# Patient Record
Sex: FEMALE | Race: WHITE | Employment: FULL TIME | URBAN - METROPOLITAN AREA
[De-identification: names, ages, dates, MRNs, and addresses within clinical notes are randomized per-mention and may not be internally consistent; named-entity substitution may affect disease eponyms.]

---

## 2021-01-11 ENCOUNTER — HOSPITAL ENCOUNTER (OUTPATIENT)
Dept: PHYSICAL THERAPY | Age: 26
Discharge: HOME | End: 2021-01-11
Payer: COMMERCIAL

## 2021-01-11 PROCEDURE — 97110 THERAPEUTIC EXERCISES: CPT

## 2021-01-11 PROCEDURE — 97162 PT EVAL MOD COMPLEX 30 MIN: CPT

## 2021-01-11 NOTE — PROGRESS NOTES
[facility]  [address]  [place], [address]  [phone] fax    Plan of Care/ Statement of Necessity for Physical Therapy Services    Patient name: Jimmy Shen Start of Care: 2021   Referral source: Rach Braswell : 1995    Medical Diagnosis: Foot pain, right [M79.671]  Payor: [employer] / Plan: [address] [place] [address] / Product Type: PPO /  Onset Date:20    Treatment Diagnosis: Right foot pain (s/p CCT arthrodesis)   Prior Hospitalization: see medical history Provider#: [id number]   Medications: Verified on Patient summary List    Comorbidities: Previous foot and ankle surgeries (, , ), Mitral/tricuspid regurgitation, atopic dermatitis, latex allergy, early osteoporosis   Prior Level of Function: functionally I with daily tasks. The Plan of Care and following information is based on the information from the initial evaluation. Assessment/ key information: 27 y/o female presents s/p right foot CCT arthrodesis as above. The pt presents ambulating with use of cam walking boot. She reports pain increases with weightbearing activities. She reports she has been in the camboot since 12/15/20 and was initially NWB and has progressed to WBAT. The pt demonstrates significantly limited right ankle ROM, limited right ankle strength, mild right ankle/foot edema, limited scar mobility and limited strength of the right LE. The pt will benefit from PT to address the aforementioned impairments. Evaluation Complexity History MEDIUM  Complexity : 1-2 comorbidities / personal factors will impact the outcome/ POC ; Examination MEDIUM Complexity : 3 Standardized tests and measures addressing body structure, function, activity limitation and / or participation in recreation  ;Presentation MEDIUM Complexity : Evolving with changing characteristics  ; Clinical Decision Making MEDIUM Complexity : FOTO score of 26-74  Overall Complexity Rating: MEDIUM  Problem List: pain affecting function, decrease ROM, decrease strength, edema affecting function, impaired gait/ balance, decrease ADL/ functional abilitiies, decrease activity tolerance and decrease flexibility/ joint mobility   Treatment Plan may include any combination of the following: Therapeutic exercise, Therapeutic activities, Neuromuscular re-education, Physical agent/modality, Gait/balance training, Manual therapy, Patient education and Self Care training  Patient / Family readiness to learn indicated by: asking questions, trying to perform skills and interest  Persons(s) to be included in education: patient (P)  Barriers to Learning/Limitations: None  Patient Goal (s): To get back to normal activities  Patient Self Reported Health Status: good  Rehabilitation Potential: good    Short Term Goals: To be accomplished in 1 weeks:   1. The pt will be I and compliant with HEP     Long Term Goals: To be accomplished in 4 weeks:   1. Improve FOTO score to 68 for improved ability for functional tasks. 2. Improve right ankle AROM DF to 5 degrees to improve ability for gait      3. Improve right ankle DF and PF MMT to 4+/5 to improve ability for daily tasks     4. The pt will progress with gait without use of cam boot for community ambulation (once cleared). Frequency / Duration: Patient to be seen 2 times per week for 4 weeks. Patient/ Caregiver education and instruction: Diagnosis, prognosis, self care, activity modification, brace/ splint application and exercises   [x]  Plan of care has been reviewed with HENOK Root, PT 1/11/2021 3:25 PM    ________________________________________________________________________    I certify that the above Therapy Services are being furnished while the patient is under my care. I agree with the treatment plan and certify that this therapy is necessary.     [facility] Signature:____________Date:_________TIME:________    [employer], Date and Time must be completed for valid certification **    Please sign and return to [address]  [place], [facility] [address]  [phone] fax

## 2021-01-15 ENCOUNTER — HOSPITAL ENCOUNTER (OUTPATIENT)
Dept: PHYSICAL THERAPY | Age: 26
Discharge: HOME | End: 2021-01-15
Payer: COMMERCIAL

## 2021-01-15 PROCEDURE — 97110 THERAPEUTIC EXERCISES: CPT

## 2021-01-15 PROCEDURE — 97140 MANUAL THERAPY 1/> REGIONS: CPT

## 2021-01-15 NOTE — PROGRESS NOTES
PT DAILY TREATMENT NOTE     Patient Name: Lina Nelson  Date:1/15/2021  : 1995  [x]  Patient  Verified  Payor: [employer] / Plan: [address] [place] [address] / Product Type: PPO /    In time:4:12  Out time: 5:10  Total Treatment Time (min): 58  Visit #: 2 of 8    Medicare/BCBS Only   Total Timed Codes (min):  58 1:1 Treatment Time:  58       Treatment Area: Foot pain, right [M79.671]    SUBJECTIVE  Pain Level (0-10 scale): 2  Any medication changes, allergies to medications, adverse drug reactions, diagnosis change, or new procedure performed?: [x] No    [] Yes (see summary sheet for update)  Subjective functional status/changes:   [] No changes reported  Minor soreness. OBJECTIVE      42 min Therapeutic Exercise:  [x] See flow sheet :   Rationale: increase ROM, increase strength and improve coordination to improve the patients ability to tolerate increased activity    18 min Manual Therapy:  Right ankle mortis mobs, Right ankle mobs for DF/PF, Lateral scar massage   The manual therapy interventions were performed at a separate and distinct time from the therapeutic activities interventions.   Rationale: decrease pain, increase ROM and increase tissue extensibility to improve daily function          With   [x] TE   [] TA   [] neuro   [] other: Patient Education: [x] Review HEP    [] Progressed/Changed HEP based on:   [] positioning   [] body mechanics   [] transfers   [] heat/ice application    [] other:      Other Objective/Functional Measures:   - AROM Right Ankle DF 5*  PF 28*  IV6*  EV4*  - PROM right ankle DF13*  PF36*  IV22* EV10*       Pain Level (0-10 scale) post treatment: 2    ASSESSMENT/Changes in Function:   - AROM after mobs/glides: DF10  PF37  - Good tolerance with first full treatment  - Good response to treatment with improved ankle mobility    Patient will continue to benefit from skilled PT services to modify and progress therapeutic interventions, address functional mobility deficits, address ROM deficits, address strength deficits, analyze and address soft tissue restrictions and analyze and cue movement patterns to attain remaining goals. []  See Plan of Care  []  See progress note/recertification  []  See Discharge Summary         Progress towards goals / Updated goals:  Short Term Goals: To be accomplished in 1 weeks:               1. The pt will be I and compliant with HEP  Current Status: Pt reports compliance with HEP                 Long Term Goals: To be accomplished in 4 weeks:               1. Improve FOTO score to 68 for improved ability for functional tasks. 2. Improve right ankle AROM DF to 5 degrees to improve ability for gait  Current Status: Met with DF 5* upon arrival, increasing to 10* after manual therapy                              3. Improve right ankle DF and PF MMT to 4+/5 to improve ability for daily tasks                  4. The pt will progress with gait without use of cam boot for community ambulation (once cleared).      PLAN  [x]  Upgrade activities as tolerated     [x]  Continue plan of care  []  Update interventions per flow sheet       []  Discharge due to:_  []  Other:_      Maxim Ibarra, PT 1/15/2021  4:16 PM    Future Appointments   Date Time Provider Dianelys Rivera   1/19/2021  4:15 PM Malissa Leslie, PT [facility]   1/22/2021  4:15 PM Malissa Leslie, PT [facility]

## 2021-01-19 ENCOUNTER — HOSPITAL ENCOUNTER (OUTPATIENT)
Dept: PHYSICAL THERAPY | Age: 26
Discharge: HOME | End: 2021-01-19
Payer: COMMERCIAL

## 2021-01-19 PROCEDURE — 97110 THERAPEUTIC EXERCISES: CPT

## 2021-01-19 PROCEDURE — 97140 MANUAL THERAPY 1/> REGIONS: CPT

## 2021-01-19 NOTE — PROGRESS NOTES
PT DAILY TREATMENT NOTE     Patient Name: Brandon Sparks  Date:2021  : 1995  [x]  Patient  Verified  Payor: [employer] / Plan: [address] [place] [address] / Product Type: PPO /    In time:4:14  Out time:5:19  Total Treatment Time (min): 65  Visit #: 3 of 8    Medicare/BCBS Only   Total Timed Codes (min):  65 1:1 Treatment Time:  65       Treatment Area: Foot pain, right [M79.671]    SUBJECTIVE  Pain Level (0-10 scale): 3  Any medication changes, allergies to medications, adverse drug reactions, diagnosis change, or new procedure performed?: [x] No    [] Yes (see summary sheet for update)  Subjective functional status/changes:   [] No changes reported  Pt reports feeling good. Notes her big toe feels \"sore\" on the underside at the joint. States she's been doing her HEP daily. OBJECTIVE    38 min Therapeutic Exercise:  [x]? See flow sheet :   Rationale: increase ROM, increase strength and improve coordination to improve the patients ability to tolerate increased activity     27 min Manual Therapy:  pt seated on plinth - ankle distraction with DF/PF, glides into DF/PF, ankle mortis mobs, cuneiform mobs   The manual therapy interventions were performed at a separate and distinct time from the therapeutic activities interventions. Rationale: decrease pain, increase ROM and increase tissue extensibility to improve daily function          With   [x] TE   [] TA   [] neuro   [] other: Patient Education: [x] Review HEP    [] Progressed/Changed HEP based on:   [x] positioning   [x] body mechanics   [] transfers   [] heat/ice application    [] other:      Other Objective/Functional Measures:   - palpation: tightness/TP in gastroc     Pain Level (0-10 scale) post treatment: 3 \"sore\"    ASSESSMENT/Changes in Function:   Pt performed well today. Tolerated new exercises well. Palpation found tenderness/tightness/TP in gastroc near achilles; light calf stretch to address.   MT performed to address limited movement in ankle foot; pt reported less pain in big toe. Pt finished exercises with reported increase in muscle \"soreness,\" without increase in pain. REC at [facility]: 1) MT/TPR to gastroc, 2) ROM measures DF/PF/EV/IV, 3) consider MMT, 4) update goals    Patient will continue to benefit from skilled PT services to modify and progress therapeutic interventions, address functional mobility deficits, address ROM deficits, address strength deficits, analyze and address soft tissue restrictions and analyze and cue movement patterns to attain remaining goals. []  See Plan of Care  []  See progress note/recertification  []  See Discharge Summary         Progress towards goals / Updated goals:  Short Term Goals: To be accomplished in 1 weeks:  1. The pt will be I and compliant with HEP   Current Status: Pt reports compliance with HEP                 Long Term Goals: To be accomplished in 4 weeks:  1. Improve FOTO score to 68 for improved ability for functional tasks.                 2. Improve right ankle AROM DF to 5 degrees to improve ability for gait   Current Status: Met with DF 5* upon arrival, increasing to 10* after manual therapy (1/15/03501)                 3. Improve right ankle DF and PF MMT to 4+/5 to improve ability for daily tasks     4. The pt will progress with gait without use of cam boot for community ambulation (once cleared).      PLAN  [x]  Upgrade activities as tolerated     [x]  Continue plan of care  []  Update interventions per flow sheet       []  Discharge due to:_  []  Other:_      Sarahi Evangelista 1/19/2021  4:20 PM    Future Appointments   Date Time Provider Dianelys Rivera   1/22/2021  4:15 PM Tc Grider, PT [facility]   1/26/2021  5:00 PM Marialuisa Griffin [place] [facility]   1[facility]28/2021  [facility]   2/1/2021  4:15 PM Marialuisa Griffin, [place] [facility]   2/3/2021  5:00 PM Marialuisa Griffin [facility]

## 2021-01-22 ENCOUNTER — HOSPITAL ENCOUNTER (OUTPATIENT)
Dept: PHYSICAL THERAPY | Age: 26
Discharge: HOME | End: 2021-01-22
Payer: COMMERCIAL

## 2021-01-22 PROCEDURE — 97140 MANUAL THERAPY 1/> REGIONS: CPT

## 2021-01-22 PROCEDURE — 97110 THERAPEUTIC EXERCISES: CPT

## 2021-01-22 NOTE — PROGRESS NOTES
PT DAILY TREATMENT NOTE     Patient Name: Marin Martin  Date:2021  : 1995  [x]  Patient  Verified  Payor: [employer] / Plan: [address] [place] [address] / Product Type: PPO /    In time:4:15  Out time:5:18  Total Treatment Time (min): 63  Visit #: 4 of 8    Medicare/BCBS Only   Total Timed Codes (min):  63 1:1 Treatment Time:  63       Treatment Area: Foot pain, right [M79.671]    SUBJECTIVE  Pain Level (0-10 scale): 0  Any medication changes, allergies to medications, adverse drug reactions, diagnosis change, or new procedure performed?: [x] No    [] Yes (see summary sheet for update)  Subjective functional status/changes:   [] No changes reported  Pt reports feeling good today. Notes less pain in bog toe compared to last visit. Almita OROZCO . OBJECTIVE    39 min Therapeutic Exercise:  [x]? ? See flow sheet : stretches and strengthening, wt bearing exercise   Rationale: increase ROM, increase strength and improve coordination to improve the patients ability to tolerate increased activity     24 min Manual Therapy:  pt seated on plinth - ankle distraction with DF/PF, glides into DF/PF, ankle mortis mobs, cuneiform mobs, DTM/STM to achilles and distal gastroc   The manual therapy interventions were performed at a separate and distinct time from the therapeutic activities interventions. Rationale: decrease pain, increase ROM and increase tissue extensibility to improve daily function                                                                 With   [x]? TE   []? TA   []? neuro   []? other: Patient Education: [x]? Review HEP    []? Progressed/Changed HEP based on:   [x]? positioning   [x]? body mechanics   []? transfers   []? heat/ice application    []? other:      Other Objective/Functional Measures:    - increased reps for AP, ankle AROM, marbles, DF slides, HS curls, calf str    Pain Level (0-10 scale) post treatment: 0 \"sore\"    ASSESSMENT/Changes in Function:   Pt performed well. Demonstrated good tolerance to increased reps with exercises; pt reported tightness in muscle at arch of foot, related to deep intrinsic plantar muscles. MT to improve ankle/foot ROM/mobility and reduce tightness in achilles/gastroc; pt reported ankle feeling \"better\" and \"less tight\" afterwards. REC at [facility]: 1) ROM measures DF/PF/EV/IV, 2) consider MMT, 3) update goals    Patient will continue to benefit from skilled PT services to modify and progress therapeutic interventions, address functional mobility deficits, address ROM deficits, address strength deficits, analyze and address soft tissue restrictions and analyze and cue movement patterns to attain remaining goals. []  See Plan of Care  []  See progress note/recertification  []  See Discharge Summary         Progress towards goals / Updated goals:  Short Term Goals: To be accomplished in 1 weeks:  1. The pt will be I and compliant with HEP              Current Status: Met, Pt reports compliance with HEP 1/22/21                 Long Term Goals: To be accomplished in 4 weeks:  1. Improve FOTO score to 68 for improved ability for functional tasks.                 2. Improve right ankle AROM DF to 5 degrees to improve ability for gait              Current Status: Met with DF 5* upon arrival, increasing to 10* after manual therapy (1/15/37786)                 3. Improve right ankle DF and PF MMT to 4+/5 to improve ability for daily tasks     4.  The pt will progress with gait without use of cam boot for community ambulation (once cleared).     PLAN  [x]  Upgrade activities as tolerated     [x]  Continue plan of care  []  Update interventions per flow sheet       []  Discharge due to:_  []  Other:_      Bc Parker 1/22/2021  5:04 PM    Future Appointments   Date Time Provider Dianelys Rivera   1/26/2021  5:00 PM Maybeuryct Poncho, [place] [facility]   1/28/2021  5:00 PM Benedict Poncho, [place] [facility]   2/1/2021  4:15 PM Henry Alcala, PTA [facility] 2/3/2021  5:00 PM Ryan Zeng PTA [place] [facility]

## 2021-01-26 ENCOUNTER — APPOINTMENT (OUTPATIENT)
Dept: PHYSICAL THERAPY | Age: 26
End: 2021-01-26
Payer: COMMERCIAL

## 2021-01-26 ENCOUNTER — HOSPITAL ENCOUNTER (OUTPATIENT)
Dept: PHYSICAL THERAPY | Age: 26
Discharge: HOME | End: 2021-01-26
Payer: COMMERCIAL

## 2021-01-26 PROCEDURE — 97110 THERAPEUTIC EXERCISES: CPT

## 2021-01-26 PROCEDURE — 97140 MANUAL THERAPY 1/> REGIONS: CPT

## 2021-01-26 NOTE — PROGRESS NOTES
PT DAILY TREATMENT NOTE     Patient Name: Edgar Montgomery  Date:2021  : 1995  [x]  Patient  Verified  Payor: [employer] / Plan: [address] [place] [address] / Product Type: PPO /    In time:5:04  Out time:6:08  Total Treatment Time (min): 64  Visit #: 5 of 8    Medicare/BCBS Only   Total Timed Codes (min):  64 1:1 Treatment Time:  64       Treatment Area: Foot pain, right [M79.671]    SUBJECTIVE  Pain Level (0-10 scale): 0 \"sore\"  Any medication changes, allergies to medications, adverse drug reactions, diagnosis change, or new procedure performed?: [x] No    [] Yes (see summary sheet for update)  Subjective functional status/changes:   [] No changes reported  Pt reports feeling good. Some \"muscle soreness\" around inside ankle to foot. OBJECTIVE     54 min Therapeutic Exercise:  [x]? ?? See flow sheet : stretches and strengthening, wt bearing exercise, AROM measures   Rationale: increase ROM, increase strength and improve coordination to improve the patients ability to tolerate increased activity    10 min Manual Therapy:  pt seated on plinth - ankle distraction with DF/PF, glides into DF/PF, ankle mortis mobs, cuneiform mobs, DTM/STM to distal tibialis anterior/tibialis posterior/flexor digitorum longus tendons around medial malleolus   The manual therapy interventions were performed at a separate and distinct time from the therapeutic activities interventions.   Rationale: decrease pain, increase ROM and increase tissue extensibility to improve daily function                                                                     With   [x] TE   [] TA   [] neuro   [] other: Patient Education: [x] Review HEP    [] Progressed/Changed HEP based on:   [] positioning   [] body mechanics   [] transfers   [] heat/ice application    [] other:      Other Objective/Functional Measures:   - AROM right ankle: DF 10, PF 43, IV 16, EV 10     Pain Level (0-10 scale) post treatment: 0 \"sore in arch\"    ASSESSMENT/Changes in Function:   Pt performed well today. Ankle pain improved following MT. Pt demonstrates improvement with ankle AROM per objective measures. Pt left feeling less tight, no pain at ankle; some soreness in arch from today's exercises. REC at [facility]: 1) MMT,2)  Progress Note for MD appt, 3) update goals     Patient will continue to benefit from skilled PT services to modify and progress therapeutic interventions, address functional mobility deficits, address ROM deficits, address strength deficits, analyze and address soft tissue restrictions and analyze and cue movement patterns to attain remaining goals. []? See Plan of Care  []? See progress note/recertification  []? See Discharge Summary         Progress towards goals / Updated goals:  Short Term Goals: To be accomplished in 1 weeks:  1. The pt will be I and compliant with HEP              Current Status: Met, Pt reports compliance with HEP 1/22/21                 Long Term Goals: To be accomplished in 4 weeks:  1. Improve FOTO score to 68 for improved ability for functional tasks.                 2. Improve right ankle AROM DF to 5 degrees to improve ability for gait              Current Status: Met - AROM right DF 10, PF 43, IV 16, EV 10 (1/26/2021)           3. Improve right ankle DF and PF MMT to 4+/5 to improve ability for daily tasks   Current:  4. The pt will progress with gait without use of cam boot for community ambulation (once cleared).       PLAN  [x]? Upgrade activities as tolerated     [x]? Continue plan of care  []? Update interventions per flow sheet       []? Discharge due to:_  []?   Other:_      LIZA Galvan 1/26/2021  10:42 AM    Future Appointments   Date Time Provider Dianelys Rivera   1/26/2021  5:00 PM Darline Kumar [facility]   1/28/2021  5:00 PM Naomi Castro [facility]   2/1/2021  4:15 PM Naomi Castro [facility]   2/3/2021  5:00 PM Naomi Castro [facility]

## 2021-01-28 ENCOUNTER — HOSPITAL ENCOUNTER (OUTPATIENT)
Dept: PHYSICAL THERAPY | Age: 26
Discharge: HOME | End: 2021-01-28
Payer: COMMERCIAL

## 2021-01-28 PROCEDURE — 97140 MANUAL THERAPY 1/> REGIONS: CPT

## 2021-01-28 PROCEDURE — 97110 THERAPEUTIC EXERCISES: CPT

## 2021-01-28 NOTE — PROGRESS NOTES
PT DAILY TREATMENT NOTE     Patient Name: Fadia Cervantes  Date:2021  : 1995  [x]  Patient  Verified  Payor: [employer] / Plan: [address] [place] [address] / Product Type: PPO /    In time:157  Out time:249  Total Treatment Time (min): 52  Visit #: 6 of 8    Medicare/BCBS Only   Total Timed Codes (min):  52 1:1 Treatment Time:  52       Treatment Area: Foot pain, right [M79.671]    SUBJECTIVE  Pain Level (0-10 scale): 0  Any medication changes, allergies to medications, adverse drug reactions, diagnosis change, or new procedure performed?: [x] No    [] Yes (see summary sheet for update)  Subjective functional status/changes:   [] No changes reported  Pt reports feeling good today; less pain at underside of foot/big toe and at inside ankle. MD visit scheduled for Fri, 2021. Pt reports 65% perceived improvement since [facility]. Average pain level in normal day 3/10, at best 0/10, at worst 4/10. Pain only present with certain movements or overuse. Perceived Improvement:  - increased ROM  - walking better (increased ROM and less pain)    Continued Deficits:  - \"limping\" with walking, hesitant to put weight on foot in boot  - right leg feels \"weaker\" than left  - balance      OBJECTIVE     44 min Therapeutic Exercise:  [x]? ??? See flow sheet : stretches and strengthening, wt bearing exercise, AROM measures, strength measures and progress assessment   Rationale: increase ROM, increase strength and improve coordination to improve the patients ability to tolerate increased activity     8 min Manual Therapy:  pt seated on plinth - ankle distraction with DF/PF, glides into DF/PF, ankle mortis mobs, cuneiform mobs   The manual therapy interventions were performed at a separate and distinct time from the therapeutic activities interventions.   Rationale: decrease pain, increase ROM and increase tissue extensibility to improve daily function              With   [x] TE   [] TA   [] neuro   [] other: Patient Education: [x] Review HEP    [] Progressed/Changed HEP based on:   [x] positioning   [x] body mechanics   [] transfers   [x] heat/ice application    [] other:      Other Objective/Functional Measures:   - started 4-way SLR   - MMT right ankle: DF = 5, PF = 5, EV = 5, IV = 3+ (all within pt's available range)    Pain Level (0-10 scale) post treatment: 0    ASSESSMENT/Changes in Function:   Pt has demonstrated good progress in her therapy since [facility] based on subjective reports, objective measures, and met goals. She cont to present with limited ROM. Rec pt continue therapy, progressing to wt bearing exercises when cleared by MD.    REC at [facility]: 1) add ankle 4-way w/ band, 2) check updated goals (see progress note)     Patient will continue to benefit from skilled PT services to modify and progress therapeutic interventions, address functional mobility deficits, address ROM deficits, address strength deficits, analyze and address soft tissue restrictions and analyze and cue movement patterns to attain remaining goals. []  See Plan of Care  []  See progress note/recertification  []  See Discharge Summary         Progress towards goals / Updated goals:  Short Term Goals: To be accomplished in 1 weeks:  1. The pt will be I and compliant with HEP              Current Status: Met, Pt reports compliance with HEP 1/22/21                 Long Term Goals: To be accomplished in 4 weeks:  1. Improve FOTO score to 68 for improved ability for functional tasks.              Current: Progressing - FOTO 64 (1/28/2021)  2. Improve right ankle AROM DF to 5 degrees to improve ability for gait              Current Status: Met - AROM right DF 10, PF 43, IV 16, EV 10 (1/26/2021)           3. Improve right ankle DF and PF MMT to 4+/5 to improve ability for daily tasks              Current: Progressing - in pt's available range: DF 5/5, PF 5/5, IV 3+/5, EV 5/5 (1/28/2021)  4.  The pt will progress with gait without use of cam boot for community ambulation (once cleared).    Current: waiting on clearance from MD for wt bearing status (1/28/2021)    PLAN  [x]  Upgrade activities as tolerated     [x]  Continue plan of care  []  Update interventions per flow sheet       []  Discharge due to:_  []  Other:_      Miranda Jerojasbir 1/28/2021  2:19 PM    Future Appointments   Date Time Provider Dianelys Rivera   2/1/2021  4:15 PM Brittaney Dunham, [place] [facility]   2/3/2021  5:00 PM Brittaney Dunham, [place] [facility]

## 2021-01-28 NOTE — PROGRESS NOTES
[facility] at [address] [facility], [address] [place] [address]  Phone: [phone]      Fax:  [phone]    Progress Note  Patient name: Ronald Watkins Start of Care: 2021   Referral source: Roger Perez : 1995                Medical Diagnosis: Foot pain, right [M79.671]  Payor: [employer] / Plan: [address] [place] [address] / Product Type: PPO /  Onset Date:20                Treatment Diagnosis: Right foot pain (s/p CCT arthrodesis)   Prior Hospitalization: see medical history Provider#: [id number]   Medications: Verified on Patient summary List   Comorbidities: Previous foot and ankle surgeries (, , ), Mitral/tricuspid regurgitation, atopic dermatitis, latex allergy, early osteoporosis  Prior Level of Function: functionally I with daily tasks. Visits from Start of Care: 6    Missed Visits: 0    Established Goals:          Excellent Good         Limited           None  [x] Increased ROM   []  [x]  []  []  [x] Increased Strength  []  [x]  []  []  [x] Increased Mobility  []  [x]  []  []   [x] Decreased Pain   []  [x]  []  []  [x] Decreased Swelling  []  [x]  []  []    Key Functional Changes:   Pt perceived improvement: 65% since [facility]. Average pain level in normal day 3/10, at best 0/10, at worst 4/10. Pain only present with certain movements or overuse.     Perceived Improvement:  - increased ROM  - walking better (increased ROM and less pain)     Continued Deficits:  - \"limping\" with walking, hesitant to put weight on foot in boot  - right leg feels \"weaker\" than left  - balance    Pt has demonstrated good progress in her therapy since [facility] based on subjective reports, objective measures, and met goals. She cont to present with limited ROM. Rec pt continue therapy, progressing to wt bearing exercises when cleared by MD.    Progress towards goals / Updated goals:  Short Term Goals: To be accomplished in 1 weeks:  1.  The pt will be I and compliant with HEP              KERSTKO Status: Met, Pt reports compliance with HEP 1/22/21  Long Term Goals: To be accomplished in 4 weeks:  1. Improve FOTO score to 68 for improved ability for functional tasks.              Current: Progressing - FOTO 64  2. Improve right ankle AROM DF to 5 degrees to improve ability for gait              Current Status: Met - AROM right DF 10, PF 43, IV 16, EV 10          3. Improve right ankle DF and PF MMT to 4+/5 to improve ability for daily tasks              Current: Progressing - in pt's available range: DF 5/5, PF 5/5, IV 3+/5, EV 5/5   4. The pt will progress with gait without use of cam boot for community ambulation (once cleared).                Current: waiting on clearance from MD for wt bearing status     Updated Goals: to be achieved in 10 treatments:  1. Pt will demonstrate AROM DF 15 degrees or more for safe floor clearance during gait. Last PN: HOLD - waiting for wt bearing clearing from MD   Current:   2. Pt will demonstrate AROM IV at least 20 deg and EV at least 10 deg for improved ankle strategy for safer SL balance. Last PN: Progressing - IV 16, EV 10   Current:  3. Pt will demonstrate MMT at least 4+/5 ankle IV for increased ankle stability to maintain safe gait. Last PN:  Progressing - IV 3+/5   Current:  4. Pt will demonstrate right SLS at least 20sec for improved stability and safer ankle strategy. Last PN: HOLD - waiting for wt bearing clearing from MD   Current:   5. Pt will score FOTO 68 or more to demonstrate improved functional ability with daily tasks.    Last PN: Progressing - 64   Current:    ASSESSMENT/RECOMMENDATIONS:  [x]Continue therapy per initial plan/protocol at a frequency of  2 x per week for 4 weeks  [x]Continue therapy with the following recommended changes: start wt bearing activities  []Discontinue therapy progressing towards or have reached established goals  []Discontinue therapy due to lack of appreciable progress towards goals  []Discontinue therapy due to lack of attendance or compliance  [x]Await Physician's recommendations/decisions regarding therapy  []Other:________________________________________________________________    Thank you for this referral.   Nelida Mccoy, [facility] 1/28/2021 4:41 PM  NOTE TO PHYSICIAN:  [address]   FAX TO [facility]: [phone]  If you are unable to process this request in 24 hours please contact our office:   [phone]  []  I have read the above report and request that my patient continue as recommended. []  I have read the above report and request that my patient continue therapy with the following changes/special instructions:________________________________________  []I have read the above report and request that my patient be discharged from therapy.     [de-identified] Signature:____________Date:_________TIME:________    [employer], Date and Time must be completed for valid certification **

## 2021-02-01 ENCOUNTER — HOSPITAL ENCOUNTER (OUTPATIENT)
Dept: PHYSICAL THERAPY | Age: 26
Discharge: HOME | End: 2021-02-01
Payer: COMMERCIAL

## 2021-02-01 PROCEDURE — 97116 GAIT TRAINING THERAPY: CPT

## 2021-02-01 PROCEDURE — 97140 MANUAL THERAPY 1/> REGIONS: CPT

## 2021-02-01 PROCEDURE — 97110 THERAPEUTIC EXERCISES: CPT

## 2021-02-01 NOTE — PROGRESS NOTES
PT DAILY TREATMENT NOTE     Patient Name: Brandon Sparks  Date:2021  : 1995  [x]  Patient  Verified  Payor: [employer] / Plan: [address] [place] [address] / Product Type: PPO /    In time:   Out time: 170  Total Treatment Time (min): 58  Visit #: 1 of 8    Medicare/BCBS Only   Total Timed Codes (min):  58 1:1 Treatment Time:  58       Treatment Area: Foot pain, right [M79.671]    SUBJECTIVE  Pain Level (0-10 scale): 0/10  Any medication changes, allergies to medications, adverse drug reactions, diagnosis change, or new procedure performed?: [x] No    [] Yes (see summary sheet for update)  Subjective functional status/changes:   [] No changes reported  \"the doctor cleared me for everything except running, jumping, and 'doing anything stupid'\"    OBJECTIVE    35 min Therapeutic Exercise:  [x] See flow sheet :mobility and strengthening   Rationale: increase ROM and increase strength to improve the patients ability to return to ADLs     12 min Manual Therapy:  pt seated on plinth - ankle distraction, PF mobs, metatarsal mobs, PF/DF with overpressure, great toe flex/ext with overpressure   The manual therapy interventions were performed at a separate and distinct time from the therapeutic activities interventions. Rationale: decrease pain, increase ROM, increase tissue extensibility, decrease edema  and decrease trigger points to improve functional mobility    11 min Gait Training:  10 feet x 3 with no device but HHA on parallel bars on level surface, heel<>toe rocking   Rationale:  Amb with no boot focusing on normalized gait pattern to return to PLOF          With   [x] TE   [] TA   [] neuro   [] other: Patient Education: [x] Review HEP    [] Progressed/Changed HEP based on:   [] positioning   [] body mechanics   [] transfers   [] heat/ice application    [] other:      Other Objective/Functional Measures:   --amb w/o boot  --weight shifting fwd/lat  --initiated ankle 4 way     Pain Level (0-10 scale) post treatment: 0/10    ASSESSMENT/Changes in Function: Pt has been cleared for activities other than running, jumping, and \"anything stupid\". Pt responds well to new activities without brace, pt reports no increased pain. Plan to continue activities to improve normal gait pattern. Pt education provided for progression out of boot and self-stretching. Patient will continue to benefit from skilled PT services to modify and progress therapeutic interventions, address functional mobility deficits, address ROM deficits, address strength deficits, analyze and address soft tissue restrictions, analyze and cue movement patterns and analyze and modify body mechanics/ergonomics to attain remaining goals. []  See Plan of Care  []  See progress note/recertification  []  See Discharge Summary         Progress towards goals / Updated goals:  Updated Goals: to be achieved in 10 treatments:  1. Pt will demonstrate AROM DF 15 degrees or more for safe floor clearance during gait. Last PN: HOLD - waiting for wt bearing clearing from MD              Current: Pt cleared for all activities (except running and jumping), progress as tolerated 2/1/21  2. Pt will demonstrate AROM IV at least 20 deg and EV at least 10 deg for improved ankle strategy for safer SL balance. Last PN: Progressing - IV 16, EV 10              Current:  3. Pt will demonstrate MMT at least 4+/5 ankle IV for increased ankle stability to maintain safe gait. Last PN:  Progressing - IV 3+/5              Current:  4. Pt will demonstrate right SLS at least 20sec for improved stability and safer ankle strategy. Last PN: HOLD - waiting for wt bearing clearing from MD              Current: Initiated single leg WB in ambulation 2/1/21  5. Pt will score FOTO 68 or more to demonstrate improved functional ability with daily tasks.               Last PN: Progressing - 64              Current:    PLAN  [x] Upgrade activities as tolerated     [x]  Continue plan of care  []  Update interventions per flow sheet       []  Discharge due to:_  []  Other:_      Anabel Mcbride PTA 2/1/2021  4:17 PM    Future Appointments   Date Time Provider Dianelys Rivera   2/3/2021  5:00 PM Rakan Jiang [facility]

## 2021-02-03 ENCOUNTER — HOSPITAL ENCOUNTER (OUTPATIENT)
Dept: PHYSICAL THERAPY | Age: 26
Discharge: HOME | End: 2021-02-03
Payer: COMMERCIAL

## 2021-02-03 PROCEDURE — 97110 THERAPEUTIC EXERCISES: CPT

## 2021-02-03 PROCEDURE — 97140 MANUAL THERAPY 1/> REGIONS: CPT

## 2021-02-03 PROCEDURE — 97116 GAIT TRAINING THERAPY: CPT

## 2021-02-03 NOTE — PROGRESS NOTES
PT DAILY TREATMENT NOTE     Patient Name: Timothy Muse  Date:2/3/2021  : 1995  [x]  Patient  Verified  Payor: [employer] / Plan: [address] [place] [address] / Product Type: PPO /   In time: 761  Out time:    Total Treatment Time (min): 69  Visit #: 2 of 8    Medicare/BCBS Only   Total Timed Codes (min):  69 1:1 Treatment Time:  69       Treatment Area: Foot pain, right [M79.671]    SUBJECTIVE  Pain Level (0-10 scale): 0/10, soreness from last visit  Any medication changes, allergies to medications, adverse drug reactions, diagnosis change, or new procedure performed?: [x] No    [] Yes (see summary sheet for update)  Subjective functional status/changes:   [x] No changes reported    OBJECTIVE    40 min Therapeutic Exercise:  [x] See flow sheet : mobility and strengthening   Rationale: increase ROM, increase strength and improve coordination to improve the patients ability to return to ADLs      15 min Manual Therapy: pt seated on plinth - ankle distraction, PF joint mobs, metatarsal mobs, PF/DF with overpressure, great toe flex/ext with overpressure, INV/EV   The manual therapy interventions were performed at a separate and distinct time from the therapeutic activities interventions.   Rationale: decrease pain, increase ROM, increase tissue extensibility and decrease trigger points to improve functional mobility    14 min Gait Trainin feet wearing caroline sneakers, focus on heel strike and toe off motions, decreased abdoul, shortened WB time on R, antalgic   Rationale: return to community ambulation          With   [x] TE   [] TA   [] neuro   [] other: Patient Education: [x] Review HEP    [] Progressed/Changed HEP based on:   [] positioning   [] body mechanics   [] transfers   [x] heat/ice application    [x] other: maintaining stretching at home     Other Objective/Functional Measures:   --all activities done with shoe on caroline feet   --200ft amb with sneaker  --BAPS A/P, INV/EV, circles CW/CCW    Pain Level (0-10 scale) post treatment: 0/10    ASSESSMENT/Changes in Function: Pt responds well to session today, pt initiated ambulation with sneakers on for the first time. Pt reports muscle fatigue but no increased pain with activities. Plan to continue progressing ambulation at future visits and increase ROM. Check ROM at next visit. Patient will continue to benefit from skilled PT services to modify and progress therapeutic interventions, address functional mobility deficits, address ROM deficits, address strength deficits, analyze and address soft tissue restrictions, analyze and cue movement patterns and analyze and modify body mechanics/ergonomics to attain remaining goals. []  See Plan of Care  []  See progress note/recertification  []  See Discharge Summary         Progress towards goals / Updated goals:  Updated Goals: to be achieved in 10 treatments:  1. Pt will demonstrate AROM DF 15 degrees or more for safe floor clearance during gait.             Last PN: HOLD - waiting for wt bearing clearing from MD              QTOBBYS: Pt cleared for all activities (except running and jumping), progress as tolerated 2/1/21  2. Pt will demonstrate AROM IV at least 20 deg and EV at least 10 deg for improved ankle strategy for safer SL balance.              Last PN: Progressing - IV 16, EV 10              Current:   3. Pt will demonstrate MMT at least 4+/5 ankle IV for increased ankle stability to maintain safe gait.                Last PN:  Progressing - IV 3+/5              Current:  4. Pt will demonstrate right SLS at least 20sec for improved stability and safer ankle strategy.             Last PN: HOLD - waiting for wt bearing clearing from MD              ONPPRKR: Initiated single leg WB in ambulation 200ft 2/3/21  5.  Pt will score FOTO 68 or more to demonstrate improved functional ability with daily tasks.              Last PN: Progressing - 64              Current:    PLAN  []  Upgrade activities as tolerated     []  Continue plan of care  []  Update interventions per flow sheet       []  Discharge due to:_  []  Other:_      Leonardo Monk PTA 2/3/2021  4:18 PM    No future appointments.

## 2021-02-08 ENCOUNTER — HOSPITAL ENCOUNTER (OUTPATIENT)
Dept: PHYSICAL THERAPY | Age: 26
Discharge: HOME | End: 2021-02-08
Payer: COMMERCIAL

## 2021-02-08 PROCEDURE — 97140 MANUAL THERAPY 1/> REGIONS: CPT

## 2021-02-08 PROCEDURE — 97110 THERAPEUTIC EXERCISES: CPT

## 2021-02-08 PROCEDURE — 97530 THERAPEUTIC ACTIVITIES: CPT

## 2021-02-08 NOTE — PROGRESS NOTES
PT DAILY TREATMENT NOTE     Patient Name: Olga Kwong  Date:2021  : 1995  [x]  Patient  Verified  Payor: BLUE CROSS / Plan: [address] [place] [address] / Product Type: PPO /    In time:4:17  Out time:5:17  Total Treatment Time (min): 60  Visit #: 3 of 8    Medicare/BCBS Only   Total Timed Codes (min):  60 1:1 Treatment Time:  60       Treatment Area: Foot pain, right [M79.121]    SUBJECTIVE  Pain Level (0-10 scale): 0  Any medication changes, allergies to medications, adverse drug reactions, diagnosis change, or new procedure performed?: [x] No    [] Yes (see summary sheet for update)  Subjective functional status/changes:   [] No changes reported  Doing fine, just a little soreness when walking on it.     OBJECTIVE    Modality rationale: decrease inflammation, decrease pain and increase tissue extensibility to improve the patients ability to ambulate with less foot pain   Min Type Additional Details    [] Estim:  []Unatt       []IFC  []Premod                        []Other:  []w/ice   []w/heat  Position:  Location:    [] Estim: []Att    []TENS instruct  []NMES                    []Other:  []w/US   []w/ice   []w/heat  Position:  Location:    []  Traction: [] Cervical       []Lumbar                       [] Prone          []Supine                       []Intermittent   []Continuous Lbs:  [] before manual  [] after manual   8 [x]  Ultrasound: [x]Continuous   [] Pulsed                           [x]1MHz   []3MHz W/cm2: 1.7  Location: Right plantar fascia    []  Iontophoresis with dexamethasone         Location: [] Take home patch   [] In clinic    []  Ice     []  heat  []  Ice massage  []  Laser   []  Anodyne Position:  Location:    []  Laser with stim  []  Other:  Position:  Location:    []  Vasopneumatic Device Pressure:       [] lo [] med [] hi   Temperature: [] lo [] med [] hi   [x] Skin assessment post-treatment:  [x]intact []redness- no adverse reaction    []redness  adverse reaction: 24 min Therapeutic Exercise:  [x] See flow sheet :   Rationale: increase ROM, increase strength and improve coordination to improve the patients ability to tolerate increased activity    10 min Therapeutic Activity:  [x]  See flow sheet :   Rationale: increase strength, improve coordination, improve balance and increase proprioception  to improve the patients ability to improve walking tolerance     18 min Manual Therapy:  STM/DTM/TPR right plantar fascia   The manual therapy interventions were performed at a separate and distinct time from the therapeutic activities interventions. Rationale: decrease pain, increase ROM, increase tissue extensibility and decrease trigger points to aid with walking tolerance      With   [] TE   [x] TA   [] neuro   [] other: Patient Education: [x] Review HEP    [] Progressed/Changed HEP based on:   [] positioning   [] body mechanics   [] transfers   [] heat/ice application    [] other:      Other Objective/Functional Measures:   - c/o pain at the bottom of the foot with walking out of boot  - Pt TTP at the right plantar fascia  - TTP right piriformis  - (+) right slump Test  - Decreased right pelvic sway with ambulation  - Elev right crest  - Decr right innominate mobility with stork stance     Pain Level (0-10 scale) post treatment: 0    ASSESSMENT/Changes in Function:   - Improved pelvic alignment and mobility with walking but continues to be limited in right pelvic mobility with ambulation  - Good exercise participation and improved gait with VCs for arm swing,  - - Continued limited trunk mobility with ambulation    Patient will continue to benefit from skilled PT services to modify and progress therapeutic interventions, address functional mobility deficits, address ROM deficits, address strength deficits, analyze and address soft tissue restrictions and analyze and cue movement patterns to attain remaining goals.      []  See Plan of Care  []  See progress note/recertification  []  See Discharge Summary                Progress towards goals / Updated goals:  Updated Goals: to be achieved in 10 treatments:  1. Pt will demonstrate AROM DF 15 degrees or more for safe floor clearance during gait.             Last PN: HOLD - waiting for wt bearing clearing from MD              Current: Progressign well with DF WFL, demonstrate good right foot clearance during swing phase of gait and a good heel strike. 2/8/21  2. Pt will demonstrate AROM IV at least 20 deg and EV at least 10 deg for improved ankle strategy for safer SL balance.              Last PN: Progressing - IV 16, EV 10              Current:   3. Pt will demonstrate MMT at least 4+/5 ankle IV for increased ankle stability to maintain safe gait.                Last PN:  Progressing - IV 3+/5              Current:  4. Pt will demonstrate right SLS at least 20sec for improved stability and safer ankle strategy.             Last PN: HOLD - waiting for wt bearing clearing from MD              Current: Initiated single leg WB in ambulation 200ft 2/3/21  5.  Pt will score FOTO 68 or more to demonstrate improved functional ability with daily tasks.              Last PN: Progressing - 64              Current:    PLAN  [x]  Upgrade activities as tolerated     [x]  Continue plan of care  []  Update interventions per flow sheet       []  Discharge due to:_  []  Other:_      Darline Benavides, PT 2/8/2021  5:44 PM    Future Appointments   Date Time Provider Dianelys Rivera   2/12/2021  5:00 PM Andrzej Blackburn [facility]   2/16/2021  3:30 PM Andrzej Blackburn [facility]   2/19/2021  5:00 PM Andrzej Blackburn [facility]   2/23/2021  4:15 PM Andrzej Blackburn [facility]   2/25/2021  4:15 PM Rama Sewell [facility]   3/1/2021  4:15 PM Ontiveros [facility]   3/3/2021  4:15 PM Elliot Yanez PT [facility]   3/8/2021  4:15 PM Rama [facility]   3/10/2021  4:15 PM Brittaney Dunham PTA Yara Sinha

## 2021-02-12 ENCOUNTER — HOSPITAL ENCOUNTER (OUTPATIENT)
Dept: PHYSICAL THERAPY | Age: 26
Discharge: HOME | End: 2021-02-12
Payer: COMMERCIAL

## 2021-02-12 PROCEDURE — 97530 THERAPEUTIC ACTIVITIES: CPT

## 2021-02-12 PROCEDURE — 97110 THERAPEUTIC EXERCISES: CPT

## 2021-02-12 PROCEDURE — 97140 MANUAL THERAPY 1/> REGIONS: CPT

## 2021-02-12 NOTE — PROGRESS NOTES
PT DAILY TREATMENT NOTE     Patient Name: Edgar Montgomery  Date:2021  : 1995  [x]  Patient  Verified  Payor: BLUE CROSS / Plan: [address] [place] [address] / Product Type: PPO /    In time:   Out time: 3276  Total Treatment Time (min): 63  Visit #: 4 of 8    Medicare/BCBS Only   Total Timed Codes (min):  63 1:1 Treatment Time:  63       Treatment Area: Foot pain, right [M79.671]    SUBJECTIVE  Pain Level (0-10 scale): 0/10  Any medication changes, allergies to medications, adverse drug reactions, diagnosis change, or new procedure performed?: [x] No    [] Yes (see summary sheet for update)  Subjective functional status/changes:   [x] No changes reported    OBJECTIVE    33 min Therapeutic Exercise:  [x] See flow sheet : mobility and strengthening   Rationale: increase ROM, increase strength, improve coordination and improve balance to improve the patients ability to tolerate increased activity    10 min Therapeutic Activity:  [x]  See flow sheet : squatting, SLS   Rationale: increase ROM, increase strength and improve balance  to improve the patients ability to return to functional movements    20 min Manual Therapy:  STM/TPR/IASTM to plantar fascia, posterior tib, TC posterior mobs for DF, ankle distraction, PF/DF overpressure, great toe flex/ext with overpressure   The manual therapy interventions were performed at a separate and distinct time from the therapeutic activities interventions.   Rationale: decrease pain, increase ROM, increase tissue extensibility and decrease trigger points to aid in walking tolerance          With   [x] TE   [] TA   [] neuro   [] other: Patient Education: [x] Review HEP    [] Progressed/Changed HEP based on:   [] positioning   [] body mechanics   [] transfers   [] heat/ice application    [] other:      Other Objective/Functional Measures:   --SLS 7\", 30\", 44\"   --initiated minisquats     Pain Level (0-10 scale) post treatment: 0/10    ASSESSMENT/Changes in Function: Pt responds well session today, pt has continued compensations with dorsiflexion limiting normal gait pattern. Discussed increasing time out of boot at home. Patient will continue to benefit from skilled PT services to modify and progress therapeutic interventions, address functional mobility deficits, address ROM deficits, address strength deficits, analyze and address soft tissue restrictions, analyze and cue movement patterns and analyze and modify body mechanics/ergonomics to attain remaining goals. []  See Plan of Care  []  See progress note/recertification  []  See Discharge Summary         Progress towards goals / Updated goals:  Updated Goals: to be achieved in 10 treatments:  1. Pt will demonstrate AROM DF 15 degrees or more for safe floor clearance during gait.             Last PN: HOLD - waiting for wt bearing clearing from MD              Current: Progressing well with DF WFL, demonstrate good right foot clearance during swing phase of gait and a good heel strike. 2/8/21  2. Pt will demonstrate AROM IV at least 20 deg and EV at least 10 deg for improved ankle strategy for safer SL balance.              Last PN: Progressing - IV 16, EV 10              Current:   3. Pt will demonstrate MMT at least 4+/5 ankle IV for increased ankle stability to maintain safe gait.                Last PN:  Progressing - IV 3+/5              Current:  4. Pt will demonstrate right SLS at least 20sec for improved stability and safer ankle strategy.             Last PN: HOLD - waiting for wt bearing clearing from MD              [id number]: Initiated, pt able to SLS for 7\", 30\", 44\" (3 trials) 2/12/21  5.  Pt will score FOTO 68 or more to demonstrate improved functional ability with daily tasks.              Last PN: Progressing - 64              Current:    PLAN  [x]  Upgrade activities as tolerated     [x]  Continue plan of care  []  Update interventions per flow sheet       []  Discharge due to:_  []  Other:_ Travis Johnson, PTA 2/12/2021  4:22 PM    Future Appointments   Date Time Provider Department Center   2/16/2021  3:30 PM Terri Jorgensen, [place] [facility]   2/19/2021  5:00 PM Terri Jorgensen [place] [facility]   2/23/2021  4[facility]   2/25/2021  4:15 PM Terri Jorgensen, [place] [facility]   3/1/2021  4:15 PM Elizabethe Minister [facility]   3/3/2021  4:15 PM Jeanie Anderson, PT [facility]   3/8/2021  4:15 PM Sil[facility]   3/10/2021  4:15 PM Terri Jorgensen [facility]

## 2021-02-16 ENCOUNTER — HOSPITAL ENCOUNTER (OUTPATIENT)
Dept: PHYSICAL THERAPY | Age: 26
Discharge: HOME | End: 2021-02-16
Payer: COMMERCIAL

## 2021-02-16 PROCEDURE — 97140 MANUAL THERAPY 1/> REGIONS: CPT

## 2021-02-16 PROCEDURE — 97530 THERAPEUTIC ACTIVITIES: CPT

## 2021-02-16 PROCEDURE — 97110 THERAPEUTIC EXERCISES: CPT

## 2021-02-16 NOTE — PROGRESS NOTES
PT DAILY TREATMENT NOTE     Patient Name: Kenny Tipton  Date:2021  : 1995  [x]  Patient  Verified  Payor: Mydish Vilas / Plan: [address] [place] [address] / Product Type: PPO /    In time: 3158  Out time: 8314  Total Treatment Time (min): 69  Visit #: 5 of 8    Medicare/BCBS Only   Total Timed Codes (min):  69 1:1 Treatment Time:  69       Treatment Area: Foot pain, right [M79.671]    SUBJECTIVE  Pain Level (0-10 scale): 0/10  Any medication changes, allergies to medications, adverse drug reactions, diagnosis change, or new procedure performed?: [x] No    [] Yes (see summary sheet for update)  Subjective functional status/changes:   [x] No changes reported, pt reports slight soreness from manual on the plantar fascia but overall well    OBJECTIVE    28 min Therapeutic Exercise:  [x] See flow sheet : mobility and strengthening   Rationale: increase ROM, increase strength and improve coordination to improve the patients ability to tolerate increased activity    16 min Therapeutic Activity:  [x]  See flow sheet : squatting, practicing normalized gait, heel/toe rocking   Rationale: increase ROM, increase strength, improve coordination and improve balance  to improve the patients ability to return to functional movements    25 min Manual Therapy:  PF/DF with overpressure, great toe flex/ext with overpressure, ankle distraction, posterior TC glides, IASTM to the plantar fascia, great toe flexor and extensor tendons, achilles tendon   The manual therapy interventions were performed at a separate and distinct time from the therapeutic activities interventions.   Rationale: decrease pain, increase ROM, increase tissue extensibility and decrease trigger points to           With   [x] TE   [] TA   [] neuro   [] other: Patient Education: [x] Review HEP    [] Progressed/Changed HEP based on:   [] positioning   [x] body mechanics   [] transfers   [] heat/ice application    [x] other: progressive lessening of boot \"on\" time     Other Objective/Functional Measures:   --inc PF/DF mobility  --increased great toe flex/ext  --increasing time out of boot as tolerated  --plantar fascia adhesions      Pain Level (0-10 scale) post treatment: 0/10    ASSESSMENT/Changes in Function: Pt responds well to session, pt is consistently making increases in ROM and functional mobility. PTA notes that there is more mobility with DF/PF and great toe flex/ext at today's visit. Fewer adhesions present in the plantar fascia today as well. Plan to continue working on normalizing gait with emphasis on toe off. Patient will continue to benefit from skilled PT services to modify and progress therapeutic interventions, address functional mobility deficits, address ROM deficits, address strength deficits, analyze and address soft tissue restrictions, analyze and cue movement patterns, analyze and modify body mechanics/ergonomics and assess and modify postural abnormalities to attain remaining goals. []  See Plan of Care  []  See progress note/recertification  []  See Discharge Summary         Progress towards goals / Updated goals:  Updated Goals: to be achieved in 10 treatments:  1. Pt will demonstrate AROM DF 15 degrees or more for safe floor clearance during gait.             Last PN: HOLD - waiting for wt bearing clearing from MD              Current: Progressing well with DF WFL, demonstrate good right foot clearance during swing phase of gait and a good heel strike. 2/8/21  2. Pt will demonstrate AROM IV at least 20 deg and EV at least 10 deg for improved ankle strategy for safer SL balance.              Last PN: Progressing - IV 16, EV 10              Current:   3. Pt will demonstrate MMT at least 4+/5 ankle IV for increased ankle stability to maintain safe gait.                Last PN:  Progressing - IV 3+/5              Current:  4.  Pt will demonstrate right SLS at least 20sec for improved stability and safer ankle strategy.             Last PN: HOLD - waiting for wt bearing clearing from MD VALDOVINOS: MET pt able to perform 3x30\" with minimal sway, no LOB 2/16/21  5.  Pt will score FOTO 68 or more to demonstrate improved functional ability with daily tasks.              Last PN: Progressing - 64              Current:    PLAN  [x]  Upgrade activities as tolerated     [x]  Continue plan of care  []  Update interventions per flow sheet       []  Discharge due to:_  []  Other:_      Travis Johnson PTA 2/16/2021  4:46 PM    Future Appointments   Date Time Provider Dianelys Rivera   2/19/2021  5:00 PM Terri Jorgensen, [place] [facility]   2/23/2021  4:15 PM Terri Jorgensen [place] [facility]   2/25/2021  4:15 PM Terri Jorgensen [place] [facility]   3/1/2021  4:15 PM Silvialle Minister [facility]   3/3/2021  4:15 PM Jeanie Anderson, PT [facility]   3/8/2021  4:15 PM Hicks[place]lle Minister [facility]   3/10/2021  4:15 PM Terri Jorgensen PTA [facility]

## 2021-02-19 ENCOUNTER — HOSPITAL ENCOUNTER (OUTPATIENT)
Dept: PHYSICAL THERAPY | Age: 26
Discharge: HOME | End: 2021-02-19
Payer: COMMERCIAL

## 2021-02-19 PROCEDURE — 97140 MANUAL THERAPY 1/> REGIONS: CPT

## 2021-02-19 PROCEDURE — 97110 THERAPEUTIC EXERCISES: CPT

## 2021-02-19 PROCEDURE — 97530 THERAPEUTIC ACTIVITIES: CPT

## 2021-02-19 NOTE — PROGRESS NOTES
PT DAILY TREATMENT NOTE     Patient Name: Tani Tipton  Date:2021  : 1995  [x]  Patient  Verified  Payor: BLUE CROSS / Plan: [address] [place] [address] / Product Type: PPO /    In time:   Out time: 1640  Total Treatment Time (min): 71  Visit #: 6 of 8    Medicare/BCBS Only   Total Timed Codes (min):  71 1:1 Treatment Time:  71     Treatment Area: Foot pain, right [M79.671]    SUBJECTIVE  Pain Level (0-10 scale): 0/10  Any medication changes, allergies to medications, adverse drug reactions, diagnosis change, or new procedure performed?: [x] No    [] Yes (see summary sheet for update)  Subjective functional status/changes:   [] No changes reported  Pt reports increased time out of the boot, reporting the ankle feels \"sore\" about FCI through the day, in the anterior portion of the ankle and with the big toe    OBJECTIVE    30 min Therapeutic Exercise:  [x]? See flow sheet : mobility and strengthening   Rationale: increase ROM, increase strength and improve coordination to improve the patients ability to tolerate increased activity     25 min Therapeutic Activity:  [x]? See flow sheet : squatting, practicing normalized gait, heel/toe rocking   Rationale: increase ROM, increase strength, improve coordination and improve balance  to improve the patients ability to return to functional movements     16 min Manual Therapy:  PF/DF with overpressure, great toe flex/ext with overpressure, ankle distraction, posterior TC glides, IASTM to the plantar fascia, great toe flexor and extensor tendons, achilles tendon   The manual therapy interventions were performed at a separate and distinct time from the therapeutic activities interventions.   Rationale: decrease pain, increase ROM, increase tissue extensibility and decrease trigger points to ease transition to normal amb          With   [x] TE   [] TA   [] neuro   [] other: Patient Education: [x] Review HEP    [] Progressed/Changed HEP based on:   [] positioning   [] body mechanics   [] transfers   [] heat/ice application    [] other:      Other Objective/Functional Measures:   --IV/EV approx 10* from neutral  --initiated toe tapping SLS     Pain Level (0-10 scale) post treatment: 0/10 \"I can feel it\"     ASSESSMENT/Changes in Function: Pt is progressing appropriately towards goals, responds well to session today with increased PF and great toe ROM. Patient will continue to benefit from skilled PT services to modify and progress therapeutic interventions, address functional mobility deficits, address ROM deficits, address strength deficits, analyze and address soft tissue restrictions, analyze and cue movement patterns, analyze and modify body mechanics/ergonomics and assess and modify postural abnormalities to attain remaining goals. []  See Plan of Care  []  See progress note/recertification  []  See Discharge Summary         Progress towards goals / Updated goals:  Updated Goals: to be achieved in 10 treatments:  1. Pt will demonstrate AROM DF 15 degrees or more for safe floor clearance during gait.             Last PN: HOLD - waiting for wt bearing clearing from MD              Current: Progressing well with DF WFL, demonstrate good right foot clearance during swing phase of gait and a good heel strike. 2/8/21  2. Pt will demonstrate AROM IV at least 20 deg and EV at least 10 deg for improved ankle strategy for safer SL balance.              Last PN: Progressing - IV 16, EV 10              Current: Progressing, IV 10*, EV 10* 2/19/21  3. Pt will demonstrate MMT at least 4+/5 ankle IV for increased ankle stability to maintain safe gait.                Last PN:  Progressing - IV 3+/5              Current: Progressing, IV 4/5 (lacking full range) 2/19/21  4. Pt will demonstrate right SLS at least 20sec for improved stability and safer ankle strategy.               Last PN: HOLD - waiting for wt bearing clearing from MD              Current: MET pt able to perform 3x30\" with minimal sway, no LOB 2/16/21  5.  Pt will score FOTO 68 or more to demonstrate improved functional ability with daily tasks.              Last PN: Progressing - 64              Current:    PLAN  [x]  Upgrade activities as tolerated     [x]  Continue plan of care  []  Update interventions per flow sheet       []  Discharge due to:_  []  Other:_      Susie Rodriguez, PTA 2/19/2021  3:49 PM    Future Appointments   Date Time Provider Dianelys Rivera   2/23/2021  4:15 PM Rojeliojuan a Landeros, [place] [facility]   2/25/2021  4:15 PM Rojelio Landeros, [place] [facility]   3/1/2021  4:15 PM Selby Grey [facility]   3/3/2021  4:15 PM Jeancarlos Chapman PT [facility]   3/8/2021  4:15 PM Grampianby Grey [facility]   3/10/2021  4:15 PM Rojelio Landeros, [place] [facility]

## 2021-02-23 ENCOUNTER — HOSPITAL ENCOUNTER (OUTPATIENT)
Dept: PHYSICAL THERAPY | Age: 26
Discharge: HOME | End: 2021-02-23
Payer: COMMERCIAL

## 2021-02-23 PROCEDURE — 97110 THERAPEUTIC EXERCISES: CPT

## 2021-02-23 PROCEDURE — 97530 THERAPEUTIC ACTIVITIES: CPT

## 2021-02-23 PROCEDURE — 97140 MANUAL THERAPY 1/> REGIONS: CPT

## 2021-02-25 ENCOUNTER — HOSPITAL ENCOUNTER (OUTPATIENT)
Dept: PHYSICAL THERAPY | Age: 26
Discharge: HOME | End: 2021-02-25
Payer: COMMERCIAL

## 2021-02-25 PROCEDURE — 97530 THERAPEUTIC ACTIVITIES: CPT

## 2021-02-25 PROCEDURE — 97110 THERAPEUTIC EXERCISES: CPT

## 2021-02-25 NOTE — PROGRESS NOTES
PT DAILY TREATMENT NOTE     Patient Name: Edgar Montgomery  Date:2021  : 1995  [x]  Patient  Verified  Payor: [employer] / Plan: [address] [place] [address] / Product Type: PPO /    In time:  Out time: 178  Total Treatment Time (min): 53  Visit #: 8 of 8    Medicare/BCBS Only   Total Timed Codes (min):  53 1:1 Treatment Time:  53       Treatment Area: Foot pain, right [M79.671]    SUBJECTIVE  Pain Level (0-10 scale): 0/10 \"sore\"  Any medication changes, allergies to medications, adverse drug reactions, diagnosis change, or new procedure performed?: [x] No    [] Yes (see summary sheet for update)  Subjective functional status/changes:   [x] No changes reported    OBJECTIVE     38 min Therapeutic Exercise:  [x]? ?? See flow sheet : mobility and strengthening   Rationale: increase ROM, increase strength and improve coordination to improve the patients ability to tolerate increased activity     15 min Therapeutic Activity:  [x]? ??  See flow sheet : squatting, practicing normalized gait, normal gait practice   Rationale: increase ROM, increase strength, improve coordination and improve balance  to improve the patients ability to return to functional movements        With   [x] TE   [] TA   [] neuro   [] other: Patient Education: [x] Review HEP    [] Progressed/Changed HEP based on:   [x] positioning   [] body mechanics   [x] transfers   [] heat/ice application    [] other:      Other Objective/Functional Measures:   --MMT right ankle DF 5/5, PF 5/5, IV 4+/5, EV 4-/5   --ROM right ankle DF 10*, PF 41*, IV 18*, EV 2*    Pain Level (0-10 scale) post treatment: 0/10 \"sore\"    ASSESSMENT/Changes in Function: Pt is progressing well towards goals, has gained significant function and range of motion since last progress note. Pt still continues to be limited in all ranges, particularly with eversion and inversion. Pt still has a minor uneven gait pattern due to hesitation and ROM limitations.  Pt would continue to benefit from continued physical therapy to address remaining limitations. Functional Gains: ambulation, driving, general mobility, reduced pain, great toe ROM  Functional Deficits: descending stairs (due to lack of DF), soreness after approx 30 mins of standing  % improvement: 85%  Pain   Average: 1/10       Best: 0/10     Worst: 3/10    Patient will continue to benefit from skilled PT services to modify and progress therapeutic interventions, address functional mobility deficits, address ROM deficits, address strength deficits, analyze and address soft tissue restrictions, analyze and cue movement patterns and analyze and modify body mechanics/ergonomics to attain remaining goals. []  See Plan of Care  [x]  See progress note/recertification  []  See Discharge Summary         Progress towards goals / Updated goals:  Updated Goals: to be achieved in 10 treatments:  1. Pt will demonstrate AROM DF 15 degrees or more for safe floor clearance during gait.             Last PN: HOLD - waiting for wt bearing clearing from MD              Current: Progressing, AROM 10* DF today 2/23/21  2. Pt will demonstrate AROM IV at least 20 deg and EV at least 10 deg for improved ankle strategy for safer SL balance.              Last PN: Progressing - IV 16, EV 10              Current: Progressing, IV 18*, EV 2* 2/25/21  3. Pt will demonstrate MMT at least 4+/5 ankle IV for increased ankle stability to maintain safe gait.                Last PN:  Progressing - IV 3+/5              Current: MET 4+/5 2/25/21  4. Pt will demonstrate right SLS at least 20sec for improved stability and safer ankle strategy.             Last PN: HOLD - waiting for wt bearing clearing from MD              Current: MET pt able to perform 3x30\" with minimal sway, no LOB 2/16/21  5.  Pt will score FOTO 68 or more to demonstrate improved functional ability with daily tasks.              Last PN: Progressing - 64              Current: Progressing, FOTO score 65 today 2/23/21    PLAN  [x]  Upgrade activities as tolerated     [x]  Continue plan of care  []  Update interventions per flow sheet       []  Discharge due to:_  []  Other:_      Regan Motta, PTA 2/25/2021  4:32 PM    Future Appointments   Date Time Provider Dianelys Rivera   3/1/2021  4:15 PM Irena Every [facility]   3/3/2021  4:15 PM Francisco J Lopez, PT [facility]   3/8/2021  4:15 PM Irena Every [facility]   3/10/2021  4:15 PM Chris Concepcion, PTA [facility]

## 2021-02-25 NOTE — PROGRESS NOTES
[facility] at [address]  Phone: [phone]      Fax:  [phone]    Progress Note  Patient name: Cecily Valadez Start of Care: 2021   Referral Marielos Caballero PA-C : 1995                Medical Diagnosis: Foot pain, right [M79.671]  Payor: [employer] / Plan: [address] [place] [address] / Product Type: PPO /  Onset Date:20                Treatment Diagnosis: Right foot pain (s/p CCT arthrodesis)   Prior Hospitalization: see medical history Provider#: [id number]   Medications: Verified on Patient summary List   Comorbidities: Previous foot and ankle surgeries (, , ), Mitral/tricuspid regurgitation, atopic dermatitis, latex allergy, early osteoporosis  Prior Level of Function: functionally I with daily tasks. Visits from Start of Care: 14                                      Missed Visits: 0      Established Goals:          Excellent Good         Limited           None  [x] Increased ROM   []  [x]  []  []  [x] Increased Strength  [x]  []  []  []  [x] Increased Mobility  []  [x]  []  []   [x] Decreased Pain   [x]  []  []  []  [x] Decreased Swelling  [x]  []  []  []    Key Functional Changes:   --MMT right ankle DF 5/5, PF 5/5, IV 4+/5, EV 4-/5   --ROM right ankle DF 10*, PF 41*, IV 18*, EV 2*  Functional Gains: ambulation, driving, general mobility, reduced pain, great toe ROM  Functional Deficits: descending stairs (due to lack of DF), soreness after approx 30 mins of standing  % improvement: 85%  Pain   Average: 1/10                  Best: 0/10                Worst: 3/10    Current Goals:  1. Pt will demonstrate AROM DF 15 degrees or more for safe floor clearance during gait.             Last PN: HOLD - waiting for wt bearing clearing from MD              Current: Not met, progressing, AROM 10* DF today 21  2.  Pt will demonstrate AROM IV at least 20 deg and EV at least 10 deg for improved ankle strategy for safer SL balance.              Last PN: Progressing - IV 16, EV 10              Current: Not met, progressing, IV 18*, EV 2* 2/25/21  3. Pt will demonstrate MMT at least 4+/5 ankle IV for increased ankle stability to maintain safe gait.                Last PN:  Progressing - IV 3+/5              Current: MET 4+/5 2/25/21  4. Pt will demonstrate right SLS at least 20sec for improved stability and safer ankle strategy.             Last PN: HOLD - waiting for wt bearing clearing from MD              Current: MET pt able to perform 3x30\" with minimal sway, no LOB 2/16/21  5. Pt will score FOTO 68 or more to demonstrate improved functional ability with daily tasks.              Last PN: Progressing - 64              Current: Progressing, FOTO score 65 today 2/23/21    Updated Goals: to be achieved in 10 visits:   1. Pt will demonstrate AROM DF 15 degrees or more for safe floor clearance during gait and descending stairs.               Last PN: Not met, progressing, AROM 10* DF today              Current:   2. Pt will demonstrate AROM IV at least 20 deg and EV at least 10 deg for improved ankle strategy for safer SL balance.              Last PN: Not met, progressing, IV 18*, EV 2*               Current:   3. Pt will score FOTO 68 or more to demonstrate improved functional ability with daily tasks.              Last PN: Progressing - 65              Current:   4. Pt will be able to stand/symptoms for 60 mins without discomfort to return to functional community ambulation   Last PN: approx 30 mins    Current:     ASSESSMENT/RECOMMENDATIONS:  [x]Continue therapy per initial plan/protocol at a frequency of  2 x per week for 5 weeks  []Continue therapy with the following recommended changes:_____________________      _____________________________________________________________________  []Discontinue therapy progressing towards or have reached established goals  []Discontinue therapy due to lack of appreciable progress towards goals  []Discontinue therapy due to lack of attendance or compliance  []Await Physician's recommendations/decisions regarding therapy  []Other:________________________________________________________________    Thank you for this referral.   Michelle Colin, PTA 2/25/2021 4:54 PM  NOTE TO PHYSICIAN:  [address]   FAX TO [facility]: [phone]  If you are unable to process this request in 24 hours please contact our office:   [phone]  []  I have read the above report and request that my patient continue as recommended. []  I have read the above report and request that my patient continue therapy with the following changes/special instructions:________________________________________  []I have read the above report and request that my patient be discharged from therapy.     [de-identified] Signature:____________Date:_________TIME:________     Bay Etienne PA-C  ** Signature, Date and Time must be completed for valid certification **

## 2021-03-01 ENCOUNTER — HOSPITAL ENCOUNTER (OUTPATIENT)
Dept: PHYSICAL THERAPY | Age: 26
Discharge: HOME | End: 2021-03-01
Payer: COMMERCIAL

## 2021-03-01 PROCEDURE — 97110 THERAPEUTIC EXERCISES: CPT

## 2021-03-01 PROCEDURE — 97140 MANUAL THERAPY 1/> REGIONS: CPT

## 2021-03-01 PROCEDURE — 97530 THERAPEUTIC ACTIVITIES: CPT

## 2021-03-01 NOTE — PROGRESS NOTES
PT DAILY TREATMENT NOTE     Patient Name: Magno Wood  Date:3/1/2021  : 1995  [x]  Patient  Verified  Payor: [employer] / Plan: [address] [place] [address] / Product Type: PPO /    In time: 492  Out time: 573  Total Treatment Time (min): 53  Visit #: 1 of 10    Medicare/BCBS Only   Total Timed Codes (min):  53 1:1 Treatment Time:  53       Treatment Area: Foot pain, right [M79.671]    SUBJECTIVE  Pain Level (0-10 scale): 0/10, lots of soreness  Any medication changes, allergies to medications, adverse drug reactions, diagnosis change, or new procedure performed?: [x] No    [] Yes (see summary sheet for update)  Subjective functional status/changes:   [] No changes reported  Pt reports she had an unexpected long outing without her boot, where she was walking around for several hours without the boot. Pt reports with increased soreness over the last 3 days as result of this. OBJECTIVE    23 min Therapeutic Exercise:  [x]? ?? See flow sheet : mobility and strengthening   Rationale: increase ROM, increase strength and improve coordination to improve the patients ability to tolerate increased activity     15 min Therapeutic Activity:  [x]? ??  See flow sheet : squatting, practicing normalized gait   Rationale: increase ROM, increase strength, improve coordination and improve balance  to improve the patients ability to return to functional movements     15 min Manual Therapy:  PF/DF with overpressure, great toe flex/ext with overpressure, ankle distraction, posterior TC glides, IASTM to the plantar fascia, great toe flexor and extensor tendons, achilles tendon   The manual therapy interventions were performed at a separate and distinct time from the therapeutic activities interventions.   Rationale: decrease pain, increase ROM, increase tissue extensibility and decrease trigger points to ease transition to normal amb        With   [x] TE   [] TA   [] neuro   [] other: Patient Education: [x] Review HEP    [] Progressed/Changed HEP based on:   [x] positioning   [] body mechanics   [] transfers   [] heat/ice application    [x] other: boot wearing      Other Objective/Functional Measures:   --Initiated 4 way hip   --VCs for reducing IV/EV compensations  --10 SLS taps in a row    Pain Level (0-10 scale) post treatment: 0/10 \"it feels so much better\"    ASSESSMENT/Changes in Function: Pt responds well to session today despite increased soreness. Focused session on increasing ROM and stretching to reduce soreness. Patient will continue to benefit from skilled PT services to modify and progress therapeutic interventions, address functional mobility deficits, address ROM deficits, address strength deficits, analyze and address soft tissue restrictions, analyze and cue movement patterns and analyze and modify body mechanics/ergonomics to attain remaining goals. []  See Plan of Care  []  See progress note/recertification  []  See Discharge Summary         Progress towards goals / Updated goals:  1. Pt will demonstrate AROM DF 15 degrees or more for safe floor clearance during gait and descending stairs.               Last PN: Not met, progressing, AROM 10* DF today              Current:   2. Pt will demonstrate AROM IV at least 20 deg and EV at least 10 deg for improved ankle strategy for safer SL balance.              Last PN: Not met, progressing, IV 18*, EV 2*               Current:   3. Pt will score FOTO 68 or more to demonstrate improved functional ability with daily tasks.              Last PN: Progressing - 65              Current:   4. Pt will be able to stand/symptoms for 60 mins without discomfort to return to functional community ambulation              Last PN: approx 30 mins               Current: Pt reports she ended up walking for several hours on accident without the boot and felt pretty sore following but no pain 3/1/21    PLAN  [x]  Upgrade activities as tolerated     [x]  Continue plan of care  []  Update interventions per flow sheet       []  Discharge due to:_  []  Other:_      Anabel Mcbride PTA 3/1/2021  4:21 PM    Future Appointments   Date Time Provider Dianelys Rivera   3/3/2021  4:15 PM Roxana Maria, PT [facility]   3/8/2021  4:15 PM Mariam Ellis [facility]   3/10/2021  4:15 PM Rakan Jiang, HENOK [facility]

## 2021-03-03 ENCOUNTER — HOSPITAL ENCOUNTER (OUTPATIENT)
Dept: PHYSICAL THERAPY | Age: 26
Discharge: HOME | End: 2021-03-03
Payer: COMMERCIAL

## 2021-03-03 PROCEDURE — 97110 THERAPEUTIC EXERCISES: CPT

## 2021-03-03 NOTE — PROGRESS NOTES
PT DAILY TREATMENT NOTE     Patient Name: Chino Ledezma  Date:3/3/2021  : 1995  [x]  Patient  Verified  Payor: [employer] / Plan: [address] [place] [address] / Product Type: PPO /    In time:4:15  Out time:5:00  Total Treatment Time (min): 45  Visit #: 2 of 10    Medicare/BCBS Only   Total Timed Codes (min):  45 1:1 Treatment Time:  45       Treatment Area: Foot pain, right [M79.671]    SUBJECTIVE  Pain Level (0-10 scale): 0  Any medication changes, allergies to medications, adverse drug reactions, diagnosis change, or new procedure performed?: [x] No    [] Yes (see summary sheet for update)  Subjective functional status/changes:   [] No changes reported  Some difficulty with walking down steps    OBJECTIVE      45 min Therapeutic Exercise:  [x] See flow sheet :   Rationale: increase ROM, increase strength and improve coordination to improve the patients ability to tolerate increased activity          With   [x] TE   [] TA   [] neuro   [] other: Patient Education: [x] Review HEP    [] Progressed/Changed HEP based on:   [] positioning   [] body mechanics   [] transfers   [] heat/ice application    [] other:      Other Objective/Functional Measures:   - AROM Right Ankle DF Right 16 Left 23   PF 41     IV 14   EV 6  - AROM Right ankle soleus 10*  - Difficulty walking down stairs with increased discomfort   - - Soleus muscle tightness > Gastroc  - Add Soleus str     Pain Level (0-10 scale) post treatment: 0    ASSESSMENT/Changes in Function:   - Good response to treatment with improved ankle mobility  - Good tolerance to added soleus    Patient will continue to benefit from skilled PT services to modify and progress therapeutic interventions, address functional mobility deficits, address ROM deficits, address strength deficits, analyze and address soft tissue restrictions and analyze and cue movement patterns to attain remaining goals.      []  See Plan of Care  []  See progress note/recertification  []  See Discharge Summary         Progress towards goals / Updated goals:  1. Pt will demonstrate AROM DF 15 degrees or more for safe floor clearance during gait and descending stairs.             Last PN: Not met, progressing, AROM 10* DF today              Current: Met at 16* today 3/3/21  2. Pt will demonstrate AROM IV at least 20 deg and EV at least 10 deg for improved ankle strategy for safer SL balance.              Last PN: Not met, progressing, IV 18*, EV 2*               Current: Progressing at IV14* EV 6* today 3/3/21  3.  Pt will score FOTO 68 or more to demonstrate improved functional ability with daily tasks.              Last PN: Progressing - 65              Current:   4. Pt will be able to stand/symptoms for 60 mins without discomfort to return to functional community ambulation              Last PN: approx 30 mins               Current: Pt reports she ended up walking for several hours on accident without the boot and felt pretty sore following but no pain 3/1/21    PLAN  [x]  Upgrade activities as tolerated     [x]  Continue plan of care  []  Update interventions per flow sheet       []  Discharge due to:_  []  Other:_      Cadence Norton, PT 3/3/2021  4:23 PM    Future Appointments   Date Time Provider Dianelys Rivera   3/8/2021  4:15 PM Kwan Arango [facility]   3/10/2021  4:15 PM Rakan Jiang PTA [facility]

## 2021-03-08 ENCOUNTER — HOSPITAL ENCOUNTER (OUTPATIENT)
Dept: PHYSICAL THERAPY | Age: 26
Discharge: HOME | End: 2021-03-08
Payer: COMMERCIAL

## 2021-03-08 PROCEDURE — 97110 THERAPEUTIC EXERCISES: CPT

## 2021-03-08 PROCEDURE — 97530 THERAPEUTIC ACTIVITIES: CPT

## 2021-03-08 NOTE — PROGRESS NOTES
PT DAILY TREATMENT NOTE     Patient Name: Gianna Bowen  Date:3/8/2021  : 1995  [x]  Patient  Verified  Payor: [employer] / Plan: [address] [place] [address] / Product Type: PPO /    In time:   Out time:   Total Treatment Time (min): 45  Visit #: 3 of 10    Medicare/BCBS Only   Total Timed Codes (min):  45 1:1 Treatment Time:  45       Treatment Area: Foot pain, right [M79.671]    SUBJECTIVE  Pain Level (0-10 scale): 1/10  Any medication changes, allergies to medications, adverse drug reactions, diagnosis change, or new procedure performed?: [x] No    [] Yes (see summary sheet for update)  Subjective functional status/changes:   [] No changes reported  Pt has increased pain today due to increased activity over the weekend    OBJECTIVE    30 min Therapeutic Exercise:  [x]? ??? See flow sheet : mobility and strengthening   Rationale: increase ROM, increase strength and improve coordination to improve the patients ability to tolerate increased activity     15 min Therapeutic Activity:  [x]? ???  See flow sheet : squatting, practicing normalized gait   Rationale: increase ROM, increase strength, improve coordination and improve balance  to improve the patients ability to return to functional movements          With   [x] TE   [] TA   [] neuro   [] other: Patient Education: [x] Review HEP    [] Progressed/Changed HEP based on:   [x] positioning   [] body mechanics   [] transfers   [] heat/ice application    [] other:      Other Objective/Functional Measures:  --focus on soleus str   --SLS with and without foam  --GTB for PF/DF    Pain Level (0-10 scale) post treatment: 1/10     ASSESSMENT/Changes in Function: Pt responds well to session, slowly progressing ROM in all planes.      Patient will continue to benefit from skilled PT services to modify and progress therapeutic interventions, address functional mobility deficits, address ROM deficits, address strength deficits, analyze and address soft tissue restrictions, analyze and cue movement patterns and analyze and modify body mechanics/ergonomics to attain remaining goals. []  See Plan of Care  []  See progress note/recertification  []  See Discharge Summary         Progress towards goals / Updated goals:  1. Pt will demonstrate AROM DF 15 degrees or more for safe floor clearance during gait and descending stairs.             Last PN: Not met, progressing, AROM 10* DF today              Current: Met at 16* today 3/3/21  2. Pt will demonstrate AROM IV at least 20 deg and EV at least 10 deg for improved ankle strategy for safer SL balance.              Last PN: Not met, progressing, IV 18*, EV 2*               Current: Progressing at IV14* EV 6* today 3/3/21  3.  Pt will score FOTO 68 or more to demonstrate improved functional ability with daily tasks.              Last PN: Progressing - 65              Current:    4. Pt will be able to stand/symptoms for 60 mins without discomfort to return to functional community ambulation              Last PN: approx 30 mins               Current: Progressing, pt had increased activity over the weekend leading to increased discomfort and soreness 3/8/21    PLAN  [x]  Upgrade activities as tolerated     [x]  Continue plan of care  []  Update interventions per flow sheet       []  Discharge due to:_  []  Other:_      Rachel Caraballo, PTA 3/8/2021  4:29 PM    Future Appointments   Date Time Provider Dianelys Rivera   3/15/2021  3:30 PM Mendoza Sanders, PT [facility]   3/17/2021  3:30 PM Mendoza Sanders, PT [facility]   3/22/2021  3:30 PM Mendoza Sanders, PT [facility]   3/24/2021  4:15 PM Jamaal Kwong [place] [facility]   3/29/2021  4:15 PM Melquiades Mcmillan [facility]   3/31/2021  4:15 PM Jamaal Kwong [place] [facility]   4/5/2021  4:15 PM Jamaal Kwong PTA [facility]

## 2021-03-10 ENCOUNTER — APPOINTMENT (OUTPATIENT)
Dept: PHYSICAL THERAPY | Age: 26
End: 2021-03-10
Payer: COMMERCIAL

## 2021-03-12 ENCOUNTER — HOSPITAL ENCOUNTER (OUTPATIENT)
Dept: PHYSICAL THERAPY | Age: 26
Discharge: HOME | End: 2021-03-12
Payer: COMMERCIAL

## 2021-03-12 PROCEDURE — 97530 THERAPEUTIC ACTIVITIES: CPT

## 2021-03-12 PROCEDURE — 97110 THERAPEUTIC EXERCISES: CPT

## 2021-03-12 NOTE — PROGRESS NOTES
PT DAILY TREATMENT NOTE     Patient Name: Carmen Yen  Date:3/12/2021  : 1995  [x]  Patient  Verified  Payor: BLUE CROSS / Plan: [address] [place] [address] / Product Type: PPO /    In time:   Out time: 123  Total Treatment Time (min): 49  Visit #: 4 of 10    Medicare/BCBS Only   Total Timed Codes (min):  49 1:1 Treatment Time:  49       Treatment Area: Foot pain, right [M79.221]    SUBJECTIVE  Pain Level (0-10 scale): 0/10 \"sore\"  Any medication changes, allergies to medications, adverse drug reactions, diagnosis change, or new procedure performed?: [x] No    [] Yes (see summary sheet for update)  Subjective functional status/changes:   [x] No changes reported    OBJECTIVE    31 min Therapeutic Exercise:  [x]? ???? See flow sheet : mobility and strengthening   Rationale: increase ROM, increase strength and improve coordination to improve the patients ability to tolerate increased activity     18 min Therapeutic Activity:  [x]? ????  See flow sheet : squatting, practicing normalized gait   Rationale: increase ROM, increase strength, improve coordination and improve balance  to improve the patients ability to return to functional movements        With   [x] TE   [] TA   [] neuro   [] other: Patient Education: [x] Review HEP    [] Progressed/Changed HEP based on:   [x] positioning   [] body mechanics   [] transfers   [] heat/ice application    [] other:      Other Objective/Functional Measures:   --increased hip 4 way to #2  --BAPs activities with Bosu  --squats with no UE assist, weight shifting to the left      Pain Level (0-10 scale) post treatment: 0/10 \"sore\"    ASSESSMENT/Changes in Function: Pt responded well to session, able to tolerate new activities with noticeable muscle fatigue in the calves right > left    Patient will continue to benefit from skilled PT services to modify and progress therapeutic interventions, address functional mobility deficits, address ROM deficits, address strength deficits, analyze and address soft tissue restrictions, analyze and cue movement patterns and analyze and modify body mechanics/ergonomics to attain remaining goals. []  See Plan of Care  []  See progress note/recertification  []  See Discharge Summary         Progress towards goals / Updated goals:  1. Pt will demonstrate AROM DF 15 degrees or more for safe floor clearance during gait and descending stairs.             Last PN: Not met, progressing, AROM 10* DF today              Current: MET at 16* today 3/3/21  2. Pt will demonstrate AROM IV at least 20 deg and EV at least 10 deg for improved ankle strategy for safer SL balance.              Last PN: Not met, progressing, IV 18*, EV 2*               Current: Progressing at IV 14* EV 6* today 3/3/21  3.  Pt will score FOTO 68 or more to demonstrate improved functional ability with daily tasks.              Last PN: Progressing - 65              Current:    4. Pt will be able to stand/symptoms for 60 mins without discomfort to return to functional community ambulation              Last PN: approx 30 mins               Current: Progressing, pt had increased activity over the weekend leading to increased discomfort and soreness 3/8/21    PLAN  [x]  Upgrade activities as tolerated     [x]  Continue plan of care  []  Update interventions per flow sheet       []  Discharge due to:_  []  Other:_      Inessa Ram, HENOK 3/12/2021  12:04 PM    Future Appointments   Date Time Provider Dianelys Rivera   3/15/2021  3:30 PM Alexandre Hernandez PT [facility]   3/17/2021  3:30 PM Alexandre Hernandez PT [facility]   3/24/2021  4:15 PM Alexandre Hernandez PT [facility]   3/26/2021  4:15 PM Alexandre Hernandez PT [facility]   3/29/2021  4:15 PM Lorri Dowd [facility]   3/31/2021  4:15 PM Rafiq Kumar [facility]   4/5/2021  4:15 PM Shun Guy PTA [facility]

## 2021-03-15 ENCOUNTER — HOSPITAL ENCOUNTER (OUTPATIENT)
Dept: PHYSICAL THERAPY | Age: 26
Discharge: HOME | End: 2021-03-15
Payer: COMMERCIAL

## 2021-03-15 PROCEDURE — 97110 THERAPEUTIC EXERCISES: CPT

## 2021-03-15 PROCEDURE — 97530 THERAPEUTIC ACTIVITIES: CPT

## 2021-03-15 PROCEDURE — 97112 NEUROMUSCULAR REEDUCATION: CPT

## 2021-03-15 NOTE — PROGRESS NOTES
PT DAILY TREATMENT NOTE     Patient Name: Marin Martin  Date:3/15/2021  : 1995  [x]  Patient  Verified  Payor: BLUE CROSS / Plan: [address] [place] [address] / Product Type: PPO /    In time:3:21  Out time: 4:29  Total Treatment Time (min): 68  Visit #: 5 of 10    Medicare/BCBS Only   Total Timed Codes (min):  68 1:1 Treatment Time:  68       Treatment Area: Foot pain, right [M79.678]    SUBJECTIVE  Pain Level (0-10 scale): 0  Any medication changes, allergies to medications, adverse drug reactions, diagnosis change, or new proceModality rationale:dure performed?: [x] No    [] Yes (see summary sheet for update)  Subjective functional status/changes:   [] No changes reported  Increased discomfort after walking around [address] for one hour. OBJECTIVE      44 min Therapeutic Exercise:  [x] See flow sheet :   Rationale: increase ROM, increase strength and improve coordination to improve the patients ability to tolerate increased activity    14 min Therapeutic Activity:  [x]  See flow sheet :   Rationale: increase strength, improve coordination and Imporoved gait pattern  to improve the patients ability to improve walking and standing tolerance      10 min Manual Therapy: Ankle Mobs, Mortis mobs, DF Str   The manual therapy interventions were performed at a separate and distinct time from the therapeutic activities interventions.   Rationale: decrease pain, increase ROM and increase tissue extensibility to tolerate increased activity          With   [x] TE   [x] TA   [] neuro   [] other: Patient Education: [x] Review HEP    [] Progressed/Changed HEP based on:   [] positioning   [] body mechanics   [] transfers   [] heat/ice application    [] other:      Other Objective/Functional Measures:   - Decr Left pelvic sway with ambulation  - Minor right antalgic gait with right pelvis rotation  - MMT Hip ABD Right 4- Left 5-, Hip Flx Right 4-4+ Left 4  - Weak Glute medius/Minimus/ER  - Add Hip ER resisted exercises  - AROM Right Ankle DF 11, IV 11, EV 10     Access Code: [email or web address]: [email or web address]/Date: 03/15/2021Prepared by: Martín Heady   Standing Repeated Marches - 2 x daily - 7 x weekly - 5 reps - 3 sets   Toe Touches - 2 x daily - 7 x weekly - 5 reps - 3 sets   Seated Flexion Stretch - 2 x daily - 7 x weekly - 1 sets - 10 reps - 5 hold   Prone Hip Extension - 2 x daily - 7 x weekly - 3 sets - 10 reps   Clamshell - 2 x daily - 7 x weekly - 3 sets - 10 reps   Sidelying Hip Abduction - 2 x daily - 7 x weekly - 3 sets - 10 reps   Sidelying Hip External Rotation in Abduction - 2 x daily - 7 x weekly - 3 sets - 10 reps   Seated March - 2 x daily - 7 x weekly - 3 sets - 10 reps   Standing Repeated Marches - 1 x daily - 7 x weekly - 3 sets - 10 reps   Seated Piriformis Stretch - 2 x daily - 7 x weekly - 3 sets - 3 reps - 30\" hold   Seated Hamstring Stretch with Chair - 2 x daily - 7 x weekly - 1 sets - 10 reps - 10 hold   Hip Flexor Stretch on Step - 1 x daily - 7 x weekly - 10 reps - 3 sets    Pain Level (0-10 scale) post treatment: 0    ASSESSMENT/Changes in Function:   - Impaired gait with pelvic obliquity and (B) hip weakness, affecting normal gait  - Pt demo understanding/awareness of gait deviation and eager to work on improving status     Patient will continue to benefit from skilled PT services to modify and progress therapeutic interventions, address functional mobility deficits, address ROM deficits, address strength deficits, analyze and address soft tissue restrictions and analyze and cue movement patterns to attain remaining goals. []  See Plan of Care  []  See progress note/recertification  []  See Discharge Summary         Progress towards goals / Updated goals:  1. Pt will demonstrate AROM DF 15 degrees or more for safe floor clearance during gait and descending stairs.               Last PN: Not met, progressing, AROM 10* DF today              Current:Progressing at 6* today 3/15/21  2. Pt will demonstrate AROM IV at least 20 deg and EV at least 10 deg for improved ankle strategy for safer SL balance.              Last PN: Not met, progressing, IV 18*, EV 2*               Current: Progressing at IV 11* EV 16* today 3/15/21  3. Pt will score FOTO 68 or more to demonstrate improved functional ability with daily tasks.              Last PN: Progressing - 65              Current:    4.  Pt will be able to stand/symptoms for 60 mins without discomfort to return to functional community ambulation              Last PN: approx 30 mins               Current: Progressing, pt amb in store for one hour with increased discomfort and soreness 3/15/21       PLAN  [x]  Upgrade activities as tolerated     [x]  Continue plan of care  []  Update interventions per flow sheet       []  Discharge due to:_  []  Other:_      Darline Benavides, PT 3/15/2021  3:00 PM    Future Appointments   Date Time Provider Dianelys Rivera   3/15/2021  3:30 PM Elliot Yanez, PT [place] [id number]   3/17/2021  3:30 PM Elliot Yanez, PT [facility] [address]   3/24/2021  4:15 PM Brittaney Dunham, [facility] [address]   3/26/2021  4:15 PM [place] [address] [address]   3/29/2021  4:15 PM Ontiveros [place] [address] [address]   3/31/2021  4:15 PM Ontiveros [place] [id number]   4/5/2021  4:15 PM Brittaney Dunham [place] [address] [address]

## 2021-03-17 ENCOUNTER — HOSPITAL ENCOUNTER (OUTPATIENT)
Dept: PHYSICAL THERAPY | Age: 26
Discharge: HOME | End: 2021-03-17
Payer: COMMERCIAL

## 2021-03-17 PROCEDURE — 97110 THERAPEUTIC EXERCISES: CPT

## 2021-03-17 PROCEDURE — 97530 THERAPEUTIC ACTIVITIES: CPT

## 2021-03-17 NOTE — PROGRESS NOTES
PT DAILY TREATMENT NOTE     Patient Name: Daron Huitron  Date:3/17/2021  : 1995  [x]  Patient  Verified  Payor: BLUE CROSS / Plan: [address] [place] [address] / Product Type: PPO /    In time:   Out time: 162  Total Treatment Time (min): 50  Visit #: 6 of 10    Medicare/BCBS Only   Total Timed Codes (min):  50 1:1 Treatment Time:  50       Treatment Area: Foot pain, right [M79.671]    SUBJECTIVE  Pain Level (0-10 scale): 0/10  Any medication changes, allergies to medications, adverse drug reactions, diagnosis change, or new procedure performed?: [x] No    [] Yes (see summary sheet for update)  Subjective functional status/changes:   [x] No changes reported  Pt notes some soreness following last session    OBJECTIVE    35 min Therapeutic Exercise:  [x]? See flow sheet : mobility and strengthening   Rationale: increase ROM, increase strength and improve coordination to improve the patients ability to tolerate increased activity     15 min Therapeutic Activity:  [x]? See flow sheet : walking, squatting, functional movement/strengthening   Rationale: increase strength, improve coordination and Imporoved gait pattern  to improve the patients ability to improve walking and standing tolerance            With   [x] TE   [] TA   [] neuro   [] other: Patient Education: [x] Review HEP    [] Progressed/Changed HEP based on:   [x] positioning   [x] body mechanics   [] transfers   [] heat/ice application    [] other:      Other Objective/Functional Measures:  --increased hip strengthening activities  --initiated clam 3 way, bridging, side step, monster walks  --increased hip 4 way to #3  --added hip abd str      Pain Level (0-10 scale) post treatment: 0/10 \"sore\"    ASSESSMENT/Changes in Function: Pt tolerates updated therex well, with hip musculature fatigue noted. Pt shown new therex and stretches with demonstrated understanding, some cueing required to avoid compensations.      Patient will continue to benefit from skilled PT services to modify and progress therapeutic interventions, address functional mobility deficits, address ROM deficits, address strength deficits, analyze and address soft tissue restrictions, analyze and cue movement patterns, analyze and modify body mechanics/ergonomics and assess and modify postural abnormalities to attain remaining goals. []  See Plan of Care  []  See progress note/recertification  []  See Discharge Summary         Progress towards goals / Updated goals:  1. Pt will demonstrate AROM DF 15 degrees or more for safe floor clearance during gait and descending stairs.             Last PN: Not met, progressing, AROM 10* DF today              Current:Progressing at 11* today 3/15/21  2. Pt will demonstrate AROM IV at least 20 deg and EV at least 10 deg for improved ankle strategy for safer SL balance.              Last PN: Not met, progressing, IV 18*, EV 2*               Current: Progressing at IV 11* EV 16* today 3/15/21  3. Pt will score FOTO 68 or more to demonstrate improved functional ability with daily tasks.              Last PN: Progressing - 65              Current:    4.  Pt will be able to stand/symptoms for 60 mins without discomfort to return to functional community ambulation              Last PN: approx 30 mins               Current: Progressing, pt amb in store for one hour with increased discomfort and soreness 3/15/21       PLAN  [x]  Upgrade activities as tolerated     [x]  Continue plan of care  []  Update interventions per flow sheet       []  Discharge due to:_  []  Other:_      Adriana Yates PTA 3/17/2021  4:19 PM    Future Appointments   Date Time Provider Dianelys Rivera   3/24/2021  4:15 PM Marlee Sora [facility]   3/26/2021  4:15 PM Marlee Sora [facility]   3/29/2021  4:15 PM Marlee Sora [facility]   3/31/2021  4:15 PM Marlee Sora [facility]   4/5/2021  4:15 PM Hong Garland, PTA [facility]

## 2021-03-22 ENCOUNTER — APPOINTMENT (OUTPATIENT)
Dept: PHYSICAL THERAPY | Age: 26
End: 2021-03-22
Payer: COMMERCIAL

## 2021-03-24 ENCOUNTER — HOSPITAL ENCOUNTER (OUTPATIENT)
Dept: PHYSICAL THERAPY | Age: 26
Discharge: HOME | End: 2021-03-24
Payer: COMMERCIAL

## 2021-03-24 PROCEDURE — 97110 THERAPEUTIC EXERCISES: CPT

## 2021-03-24 PROCEDURE — 97530 THERAPEUTIC ACTIVITIES: CPT

## 2021-03-24 NOTE — PROGRESS NOTES
[facility] at [address] [facility], [address] [place] [address]  Phone: [phone]      Fax:  [phone]    Progress Note  Patient name: Cecily Valadez Start of Care: 2021   Referral Veronica Garcia PA-C : 1995                Medical Diagnosis: Foot pain, right [M79.671]  Payor: [employer] / Plan: [address] [place] [address] / Product Type: PPO /  Onset Date:20                Treatment Diagnosis: Right foot pain (s/p CCT arthrodesis)   Prior Hospitalization: see medical history Provider#: [id number]   Medications: Verified on Patient summary List   Comorbidities: Previous foot and ankle surgeries (, , ), Mitral/tricuspid regurgitation, atopic dermatitis, latex allergy, early osteoporosis  Prior Level of Function: functionally I with daily tasks. Visits from Start of Care: 21    Missed Visits: 0    Established Goals:          Excellent Good         Limited           None  [x] Increased ROM   []  []  [x]  []  [x] Increased Strength  []  [x]  []  []  [x] Increased Mobility  []  [x]  []  []   [x] Decreased Pain   [x]  []  []  []  [x] Increased functionality  []  [x]  []  []    Key Functional Changes:   Perceived Improvements: Walking for longer periods, gait pattern, stairs  Perceived Deficits: walking a mile (continuous), ROM, descending stairs     Pain at best: 0/10  Pain at worst: 1/10  Pain on average: 0.5/10     Rated improvement since SOC: 85% improvement since [facility]     MMT: right hip flex 5/5, abd 4+/5, ext 3+/5  AROM: PF 40*, DF 16*, IV 20*, EV 9*    Current Goals:  1. Pt will demonstrate AROM DF 15 degrees or more for safe floor clearance during gait and descending stairs.             Last PN: Not met, progressing, AROM 10* DF today              DFLYPPP: MET 16* 3/24/21   2.  Pt will demonstrate AROM IV at least 20 deg and EV at least 10 deg for improved ankle strategy for safer SL balance.              Last PN: Not met, progressing, IV 18*, EV 2*               Current: Progressing at IV 20*, EV 9* 3/24/21  3. Pt will score FOTO 68 or more to demonstrate improved functional ability with daily tasks.              Last PN: Progressing - 65              Current:   TBD  4. Pt will be able to maintain standing/walking for 60 mins without discomfort to return to functional community ambulation              Last PN: approx 30 mins               Current: MET pt was able to stand for several hours without pain, only noting soreness following 3/24/21    Updated Goals: to be achieved in 8 visits:  1. Pt will report 90% improvement to regularly perform ADLs with ease              Status at last note/certification: 06%   Current Status:             2.  Pt will demonstrate AROM IV at least 20 deg and EV at least 10 deg for improved ankle strategy for safer SL balance              Status at last note/certification: Progressing at IV 20*, EV 9*   Current Status:             3.  Pt will score FOTO 68 or more to demonstrate improved functional ability with daily tasks. Status at last note/certification: Progressing - 65   Current Status:             4.  Pt will be able to amb 1/2 mile with no increased pain or soreness to achieve normal community ambulation              Status at last note/certification: Soreness with prolonged amb   Current Status:                        ASSESSMENT/RECOMMENDATIONS: Pt has responded well to treatments, attending 21 sessions to this point, making significant improvements in ROM, strength, and overall ability to improve function. Pt has been able to continue improving standing and active tolerance. Pt would benefit from continued skilled PT to address remaining gait abnormalities, lack of range of motion, and hip strengthening.      [x]Continue therapy per initial plan/protocol at a frequency of  1-2 x per week for 8 visits  []Continue therapy with the following recommended changes:_____________________ _____________________________________________________________________  []Discontinue therapy progressing towards or have reached established goals  []Discontinue therapy due to lack of appreciable progress towards goals  []Discontinue therapy due to lack of attendance or compliance  []Await Physician's recommendations/decisions regarding therapy  []Other:________________________________________________________________    Thank you for this referral.   Brandon Champion, PTA 3/24/2021 5:10 PM  NOTE TO PHYSICIAN:  [address] TO [facility]: [phone]  If you are unable to process this request in 24 hours please contact our office:   [phone]  []  I have read the above report and request that my patient continue as recommended. []  I have read the above report and request that my patient continue therapy with the following changes/special instructions:________________________________________  []I have read the above report and request that my patient be discharged from therapy.     [de-identified] Signature:____________Date:_________TIME:________     Ken Kent PA-C  ** Signature, Date and Time must be completed for valid certification **

## 2021-03-24 NOTE — PROGRESS NOTES
PT DAILY TREATMENT NOTE     Patient Name: Crystal Tadeo  Date:3/24/2021  : 1995  [x]  Patient  Verified  Payor: [employer] [place] / Plan: [address] [place] [address] / Product Type: PPO /    In time: 48  Out time: 465  Total Treatment Time (min): 55  Visit #: 7 of 10    Medicare/BCBS Only   Total Timed Codes (min):  55 1:1 Treatment Time:  55       Treatment Area: Foot pain, right [M79.677]    SUBJECTIVE  Pain Level (0-10 scale): 0/10  Any medication changes, allergies to medications, adverse drug reactions, diagnosis change, or new procedure performed?: [x] No    [] Yes (see summary sheet for update)  Subjective functional status/changes:   [] No changes reported  Pt reports improved performance of stair ascending/descending    OBJECTIVE    40 min Therapeutic Exercise:  [x]? ? See flow sheet : mobility and strengthening   Rationale: increase ROM, increase strength and improve coordination to improve the patients ability to tolerate increased activity     15 min Therapeutic Activity:  [x]? ?  See flow sheet : walking, squatting, functional movement/strengthening   Rationale: increase strength, improve coordination and Imporoved gait pattern  to improve the patients ability to improve walking and standing tolerance          With   [x] TE   [] TA   [] neuro   [] other: Patient Education: [x] Review HEP    [] Progressed/Changed HEP based on:   [x] positioning   [x] body mechanics   [] transfers   [] heat/ice application    [] other:      Other Objective/Functional Measures:   --improvement in tolerance for hip strengthening  --DL ascend/ SL descend HR with ecc focus      Perceived Improvements: Walking for longer periods, gait pattern, stairs  Perceived Deficits: walking a mile (continuous), ROM, descending stairs    Pain at best: 0/10  Pain at worst: 1/10  Pain on average: 0.5/10    Rated improvement since SOC: 85% improvement since [facility]    MMT: right hip flex 5/5, abd 4+/5, ext 3+/5  AROM: PF 40*, DF 16*, IV 20*, EV 9*    Pain Level (0-10 scale) post treatment: 0/10    ASSESSMENT/Changes in Function: Pt has responded well to treatments, attending 21 sessions to this point, making significant improvements in ROM, strength, and overall ability to improve function. Pt has been able to continue improving standing and active tolerance. Pt would benefit from continued skilled PT to address remaining gait abnormalities, lack of range of motion, and hip strengthening. Patient will continue to benefit from skilled PT services to modify and progress therapeutic interventions, address functional mobility deficits, address ROM deficits, address strength deficits, analyze and address soft tissue restrictions, analyze and cue movement patterns, analyze and modify body mechanics/ergonomics and assess and modify postural abnormalities to attain remaining goals. []  See Plan of Care  []  See progress note/recertification  []  See Discharge Summary         Progress towards goals / Updated goals:  1. Pt will demonstrate AROM DF 15 degrees or more for safe floor clearance during gait and descending stairs.             Last PN: Not met, progressing, AROM 10* DF today              Current:MET 16* 3/24/21   2. Pt will demonstrate AROM IV at least 20 deg and EV at least 10 deg for improved ankle strategy for safer SL balance.              Last PN: Not met, progressing, IV 18*, EV 2*               Current: Progressing at IV 20*, EV 9* 3/24/21  3. Pt will score FOTO 68 or more to demonstrate improved functional ability with daily tasks.              Last PN: Progressing - 65              Current:    4.  Pt will be able to maintain standing/walking for 60 mins without discomfort to return to functional community ambulation              Last PN: approx 30 mins               Current: MET pt was able to stand for several hours without pain, only noting soreness following 3/24/21    PLAN  [x]  Upgrade activities as tolerated     [x] Continue plan of care  []  Update interventions per flow sheet       []  Discharge due to:_  []  Other:_      Travis Johnson PTA 3/24/2021  4:29 PM    Future Appointments   Date Time Provider Dianelys Rivera   3/26/2021 11:30 AM Jesusa Phalen, MD [facility]   3/26/2021  4:15 PM Joie Beltrán [facility]   3/29/2021  4:15 PM Silvialle Minister [facility]   3/31/2021  4:15 PM Silvialle Minister [facility]   4/5/2021  4:15 PM Terri Jorgensen PTA [facility]

## 2021-03-26 ENCOUNTER — HOSPITAL ENCOUNTER (OUTPATIENT)
Dept: LAB | Age: 26
Discharge: HOME | End: 2021-03-26
Payer: COMMERCIAL

## 2021-03-26 ENCOUNTER — VIRTUAL VISIT (OUTPATIENT)
Dept: FAMILY MEDICINE CLINIC | Age: 26
End: 2021-03-26
Payer: COMMERCIAL

## 2021-03-26 ENCOUNTER — HOSPITAL ENCOUNTER (OUTPATIENT)
Dept: PHYSICAL THERAPY | Age: 26
Discharge: HOME | End: 2021-03-26
Payer: COMMERCIAL

## 2021-03-26 DIAGNOSIS — R23.3 BRUISES EASILY: Primary | ICD-10-CM

## 2021-03-26 DIAGNOSIS — R23.3 BRUISES EASILY: ICD-10-CM

## 2021-03-26 LAB
25(OH)D3 SERPL-MCNC: 15.9 NG/ML (ref 30–100)
ALBUMIN SERPL-MCNC: 4.6 G/DL (ref 3.4–5)
ALBUMIN/GLOB SERPL: 1.4 {RATIO} (ref 0.8–1.7)
ALP SERPL-CCNC: 70 U/L (ref 45–117)
ALT SERPL-CCNC: 24 U/L (ref 13–56)
ANION GAP SERPL CALC-SCNC: 7 MMOL/L (ref 3–18)
AST SERPL-CCNC: 16 U/L (ref 10–38)
BASOPHILS # BLD: 0 K/UL (ref 0–0.1)
BASOPHILS NFR BLD: 0 % (ref 0–2)
BILIRUB SERPL-MCNC: 1 MG/DL (ref 0.2–1)
BUN SERPL-MCNC: 10 MG/DL (ref 7–18)
BUN/CREAT SERPL: 16 (ref 12–20)
CALCIUM SERPL-MCNC: 9 MG/DL (ref 8.5–10.1)
CHLORIDE SERPL-SCNC: 107 MMOL/L (ref 100–111)
CO2 SERPL-SCNC: 26 MMOL/L (ref 21–32)
CREAT SERPL-MCNC: 0.62 MG/DL (ref 0.6–1.3)
DIFFERENTIAL METHOD BLD: ABNORMAL
EOSINOPHIL # BLD: 0.2 K/UL (ref 0–0.4)
EOSINOPHIL NFR BLD: 3 % (ref 0–5)
ERYTHROCYTE [DISTWIDTH] IN BLOOD BY AUTOMATED COUNT: 12.5 % (ref 11.6–14.5)
GLOBULIN SER CALC-MCNC: 3.3 G/DL (ref 2–4)
GLUCOSE SERPL-MCNC: 120 MG/DL (ref 74–99)
HCT VFR BLD AUTO: 38.5 % (ref 35–45)
HGB BLD-MCNC: 13.5 G/DL (ref 12–16)
INR PPP: 1.1 (ref 0.8–1.2)
LYMPHOCYTES # BLD: 2.1 K/UL (ref 0.9–3.6)
LYMPHOCYTES NFR BLD: 40 % (ref 21–52)
MCH RBC QN AUTO: 33.6 PG (ref 24–34)
MCHC RBC AUTO-ENTMCNC: 35.1 G/DL (ref 31–37)
MCV RBC AUTO: 95.8 FL (ref 74–97)
MONOCYTES # BLD: 0.4 K/UL (ref 0.05–1.2)
MONOCYTES NFR BLD: 8 % (ref 3–10)
NEUTS SEG # BLD: 2.5 K/UL (ref 1.8–8)
NEUTS SEG NFR BLD: 49 % (ref 40–73)
PLATELET # BLD AUTO: 148 K/UL (ref 135–420)
PMV BLD AUTO: 10.9 FL (ref 9.2–11.8)
POTASSIUM SERPL-SCNC: 3.6 MMOL/L (ref 3.5–5.5)
PROT SERPL-MCNC: 7.9 G/DL (ref 6.4–8.2)
PROTHROMBIN TIME: 14.2 SEC (ref 11.5–15.2)
RBC # BLD AUTO: 4.02 M/UL (ref 4.2–5.3)
SODIUM SERPL-SCNC: 140 MMOL/L (ref 136–145)
WBC # BLD AUTO: 5.2 K/UL (ref 4.6–13.2)

## 2021-03-26 PROCEDURE — 86617 LYME DISEASE ANTIBODY: CPT

## 2021-03-26 PROCEDURE — 85025 COMPLETE CBC W/AUTO DIFF WBC: CPT

## 2021-03-26 PROCEDURE — 99203 OFFICE O/P NEW LOW 30 MIN: CPT | Performed by: FAMILY MEDICINE

## 2021-03-26 PROCEDURE — 82306 VITAMIN D 25 HYDROXY: CPT

## 2021-03-26 PROCEDURE — 36415 COLL VENOUS BLD VENIPUNCTURE: CPT

## 2021-03-26 PROCEDURE — 85610 PROTHROMBIN TIME: CPT

## 2021-03-26 PROCEDURE — 97530 THERAPEUTIC ACTIVITIES: CPT

## 2021-03-26 PROCEDURE — 80053 COMPREHEN METABOLIC PANEL: CPT

## 2021-03-26 PROCEDURE — 97110 THERAPEUTIC EXERCISES: CPT

## 2021-03-26 NOTE — PROGRESS NOTES
Timothy Muse is a 26 y.o. female who was seen by synchronous (real-time) audio-video technology on 3/26/2021 for Bleeding/Bruising (she gets bruises on both lower ext. no bleeding from other areas. no fever or chills.)        Assessment & Plan:   Diagnoses and all orders for this visit:    1. Bruises easily  -     CBC WITH AUTOMATED DIFF; Future  -     METABOLIC PANEL, COMPREHENSIVE; Future  -     PROTHROMBIN TIME + INR; Future  -     LYME AB, IGG & IGM BY WB; Future  -     VITAMIN D, 25 HYDROXY; Future          712  Subjective:       Prior to Admission medications    Not on File     There is no problem list on file for this patient. History reviewed. No pertinent past medical history. Review of Systems   Constitutional: Negative for chills and fever. Respiratory: Negative for cough and shortness of breath. Cardiovascular: Negative for chest pain and palpitations. Musculoskeletal: Negative. Neurological: Negative. Endo/Heme/Allergies: Negative for environmental allergies and polydipsia. Bruises/bleeds easily. Psychiatric/Behavioral: Negative. Objective:     Patient-Reported Vitals 3/26/2021   Patient-Reported Weight 138   Patient-Reported Height 57   Patient-Reported Temperature 97.6   Patient-Reported LMP March 5      General: alert, cooperative, no distress   Mental  status: normal mood, behavior, speech, dress, motor activity, and thought processes, able to follow commands   HENT: NCAT   Neck: no visualized mass   Resp: no respiratory distress   Neuro: no gross deficits   Skin: no discoloration or lesions of concern on visible areas   Psychiatric: normal affect, consistent with stated mood, no evidence of hallucinations     Additional exam findings: We discussed the expected course, resolution and complications of the diagnosis(es) in detail. Medication risks, benefits, costs, interactions, and alternatives were discussed as indicated.   I advised her to contact the office if her condition worsens, changes or fails to improve as anticipated. She expressed understanding with the diagnosis(es) and plan. Ronald Watkins, was evaluated through a synchronous (real-time) audio-video encounter. The patient (or guardian if applicable) is aware that this is a billable service. Verbal consent to proceed has been obtained within the past 12 months. The visit was conducted pursuant to the emergency declaration under the [address] [place] [address] and the [employer] General Act. Patient identification was verified, and a caregiver was present when appropriate. The patient was located in a state where the provider was credentialed to provide care.     Cathleen Moody MD

## 2021-03-26 NOTE — PROGRESS NOTES
PT DAILY TREATMENT NOTE     Patient Name: Imelda Reyes  Date:3/26/2021  : 1995  [x]  Patient  Verified  Payor: [employer] / Plan: [address] [place] [address] / Product Type: PPO /    In time: 90  Out time: 51  Total Treatment Time (min): 57  Visit #: 1 of 8    Medicare/BCBS Only   Total Timed Codes (min):  57 1:1 Treatment Time:  57     Treatment Area: Foot pain, right [M79.671]    SUBJECTIVE  Pain Level (0-10 scale): 0/10  Any medication changes, allergies to medications, adverse drug reactions, diagnosis change, or new procedure performed?: [x] No    [] Yes (see summary sheet for update)  Subjective functional status/changes:   [x] No changes reported    OBJECTIVE    34 min Therapeutic Exercise:  [x]? ?? See flow sheet : mobility and strengthening   Rationale: increase ROM, increase strength and improve coordination to improve the patients ability to tolerate increased activity     23 min Therapeutic Activity:  [x]? ??  See flow sheet : walking, squatting, functional movement/strengthening   Rationale: increase strength, improve coordination and Imporoved gait pattern  to improve the patients ability to improve walking and standing tolerance         With   [x] TE   [] TA   [] neuro   [] other: Patient Education: [x] Review HEP    [] Progressed/Changed HEP based on:   [x] positioning   [] body mechanics   [] transfers   [] heat/ice application    [] other:      Other Objective/Functional Measures:   --too much weakness to achieve ecc lower on HR, switched to ecc lower DL   --increased pain in the arch of foot with walking 1/2 mi   --improved tolerance for hip strengthening activities  --added donkey kicks    Pain Level (0-10 scale) post treatment: 0/10 \"sore\"    ASSESSMENT/Changes in Function: Good tolerance to today's session.      Patient will continue to benefit from skilled PT services to modify and progress therapeutic interventions, address functional mobility deficits, address ROM deficits, address strength deficits, analyze and address soft tissue restrictions, analyze and cue movement patterns and analyze and modify body mechanics/ergonomics to attain remaining goals.     []  See Plan of Care  []  See progress note/recertification  []  See Discharge Summary         Progress towards goals / Updated goals:  1.  Pt will report 90% improvement to regularly perform ADLs with ease              Status at last note/certification: 85%              Current Status:             2.  Pt will demonstrate AROM IV at least 20 deg and EV at least 10 deg for improved ankle strategy for safer SL balance              Status at last note/certification: Progressing at IV 20*, EV 9*              Current Status:             3.  Pt will score FOTO 68 or more to demonstrate improved functional ability with daily tasks.              Status at last note/certification: Progressing - 65              Current Status:             4.  Pt will be able to amb 1/2 mile with no increased pain or soreness to achieve normal community ambulation              Status at last note/certification: Soreness with prolonged amb              Current Status:   Progressing, 1/2 mi in 13'29\", some increased pain/soreness in the arch of the foot 3/26/21                     PLAN  [x]  Upgrade activities as tolerated     [x]  Continue plan of care  []  Update interventions per flow sheet       []  Discharge due to:_  []  Other:_      Nithya Flower PTA 3/26/2021  4:36 PM    Future Appointments   Date Time Provider Department Center   3/29/2021  4:15 PM Nithya Flower PTA [facility]   3/31/2021  4:15 PM Nithya Flower PTA [facility]   4/5/2021  4:15 PM Nithya Flower PTA [facility]

## 2021-03-26 NOTE — PROGRESS NOTES
Chief Complaint   Patient presents with    Bleeding/Bruising     Pt c/o unexplained bruising all over body. States she has issues with getting cold very easily. Has had episode in the past where her arm has 'felt like it was on fire' and then an episode where her face swelled for 2 weeks. She saw multiple doctors, but states she never got dx.

## 2021-03-29 ENCOUNTER — HOSPITAL ENCOUNTER (OUTPATIENT)
Dept: PHYSICAL THERAPY | Age: 26
Discharge: HOME | End: 2021-03-29
Payer: COMMERCIAL

## 2021-03-29 PROCEDURE — 97110 THERAPEUTIC EXERCISES: CPT

## 2021-03-29 PROCEDURE — 97530 THERAPEUTIC ACTIVITIES: CPT

## 2021-03-29 NOTE — PROGRESS NOTES
PT DAILY TREATMENT NOTE     Patient Name: Jorge Alberto Harvey  Date:3/29/2021  : 1995  [x]  Patient  Verified  Payor: [employer] / Plan: [address] [place] [address] / Product Type: PPO /    In time: 2573  Out time: 1700  Total Treatment Time (min): 45  Visit #: 2 of 8    Medicare/BCBS Only   Total Timed Codes (min):  45 1:1 Treatment Time:  45       Treatment Area: Foot pain, right [M79.671]    SUBJECTIVE  Pain Level (0-10 scale): 0/10  Any medication changes, allergies to medications, adverse drug reactions, diagnosis change, or new procedure performed?: [x] No    [] Yes (see summary sheet for update)  Subjective functional status/changes:   [x] No changes reported    OBJECTIVE     30 min Therapeutic Exercise:  [x]? ??? See flow sheet : mobility and strengthening   Rationale: increase ROM, increase strength and improve coordination to improve the patients ability to tolerate increased activity     15 min Therapeutic Activity:  [x]? ???  See flow sheet : walking, squatting, functional movement/strengthening   Rationale: increase strength, improve coordination and Imporoved gait pattern  to improve the patients ability to improve walking and standing tolerance          With   [x] TE   [x] TA   [] neuro   [] other: Patient Education: [x] Review HEP    [] Progressed/Changed HEP based on:   [x] positioning   [x] body mechanics   [] transfers   [] heat/ice application    [] other:      Other Objective/Functional Measures:   --gastroc/soleus/big toe stretches prior to TM  --slowed TM walking to focus on gait pattern, 1/4 mi in 7'40\" at 2.0    Pain Level (0-10 scale) post treatment: 0/10    ASSESSMENT/Changes in Function: Pt responded well to session, able to better tolerate activities with adjustments made.      Patient will continue to benefit from skilled PT services to modify and progress therapeutic interventions, address functional mobility deficits, address ROM deficits, address strength deficits, analyze and address soft tissue restrictions, analyze and cue movement patterns, analyze and modify body mechanics/ergonomics and assess and modify postural abnormalities to attain remaining goals.      []  See Plan of Care  []  See progress note/recertification  []  See Discharge Summary         Progress towards goals / Updated goals:  1.  Pt will report 90% improvement to regularly perform ADLs with ease              Status at last note/certification: 85%              Current Status:             2.  Pt will demonstrate AROM IV at least 20 deg and EV at least 10 deg for improved ankle strategy for safer SL balance              Status at last note/certification: Progressing at IV 20*, EV 9*              Current Status:             3.  Pt will score FOTO 68 or more to demonstrate improved functional ability with daily tasks.              Status at last note/certification: Progressing - 65              Current Status:             4.  Pt will be able to amb 1/2 mile with no increased pain or soreness to achieve normal community ambulation              Status at last note/certification: Soreness with prolonged amb              Current Status:   Progressing, 1/4 mi with decreased speed for gait focus, no pain 3/29/21               PLAN  [x]  Upgrade activities as tolerated     [x]  Continue plan of care  []  Update interventions per flow sheet       []  Discharge due to:_  []  Other:_      Davis Larose PTA 3/29/2021  4:26 PM    Future Appointments   Date Time Provider Dianelys Rivera   3/31/2021  4:15 PM Nancy Duff [facility]   4/5/2021  4:15 PM Shan Carmona PTA [facility]

## 2021-03-30 ENCOUNTER — TELEPHONE (OUTPATIENT)
Dept: FAMILY MEDICINE CLINIC | Age: 26
End: 2021-03-30

## 2021-03-30 DIAGNOSIS — R23.3 BRUISES EASILY: Primary | ICD-10-CM

## 2021-03-30 RX ORDER — ERGOCALCIFEROL 1.25 MG/1
50000 CAPSULE ORAL
Qty: 12 CAP | Refills: 1 | Status: SHIPPED | OUTPATIENT
Start: 2021-03-30 | End: 2022-02-21 | Stop reason: ALTCHOICE

## 2021-03-31 ENCOUNTER — HOSPITAL ENCOUNTER (OUTPATIENT)
Dept: PHYSICAL THERAPY | Age: 26
Discharge: HOME | End: 2021-03-31
Payer: COMMERCIAL

## 2021-03-31 PROCEDURE — 97110 THERAPEUTIC EXERCISES: CPT

## 2021-03-31 PROCEDURE — 97530 THERAPEUTIC ACTIVITIES: CPT

## 2021-03-31 NOTE — PROGRESS NOTES
PT DAILY TREATMENT NOTE     Patient Name: Ranjana Search  Date:3/31/2021  : 1995  [x]  Patient  Verified  Payor: [employer] / Plan: [address] [place] [address] / Product Type: PPO /    In time: 9  Out time: 1700  Total Treatment Time (min): 45  Visit #: 3 of 8    Medicare/BCBS Only   Total Timed Codes (min):  45 1:1 Treatment Time:  45       Treatment Area: Foot pain, right [M79.941]    SUBJECTIVE  Pain Level (0-10 scale): 0/10, sore  Any medication changes, allergies to medications, adverse drug reactions, diagnosis change, or new procedure performed?: [x] No    [] Yes (see summary sheet for update)  Subjective functional status/changes:   [] No changes reported  Pt is sore because she had to stand on her deck railing. OBJECTIVE    30 min Therapeutic Exercise:  [x]? ???? See flow sheet : mobility and strengthening   Rationale: increase ROM, increase strength and improve coordination to improve the patients ability to tolerate increased activity     15 min Therapeutic Activity:  [x]? ????  See flow sheet : walking, squatting, functional movement/strengthening   Rationale: increase strength, improve coordination and Imporoved gait pattern  to improve the patients ability to improve walking and standing tolerance        With   [x] TE   [] TA   [] neuro   [] other: Patient Education: [x] Review HEP    [] Progressed/Changed HEP based on:   [] positioning   [x] body mechanics   [] transfers   [] heat/ice application    [] other:      Other Objective/Functional Measures:   --improved gait on TM  --SLS on foam with fwd/lat/post tapping     Pain Level (0-10 scale) post treatment: 0/10 \"sore\"    ASSESSMENT/Changes in Function: Pt responds well to familiar session, no noted symptom increase    Patient will continue to benefit from skilled PT services to modify and progress therapeutic interventions, address functional mobility deficits, address ROM deficits, address strength deficits, analyze and address soft tissue restrictions, analyze and cue movement patterns and analyze and modify body mechanics/ergonomics to attain remaining goals.      []  See Plan of Care  []  See progress note/recertification  []  See Discharge Summary         Progress towards goals / Updated goals:  1.  Pt will report 90% improvement to regularly perform ADLs with ease              Status at last note/certification: 85%              Current Status:             2.  Pt will demonstrate AROM IV at least 20 deg and EV at least 10 deg for improved ankle strategy for safer SL balance              Status at last note/certification: Progressing at IV 20*, EV 9*              Current Status:             3.  Pt will score FOTO 68 or more to demonstrate improved functional ability with daily tasks.              Status at last note/certification: Progressing - 65              Current Status:             4.  Pt will be able to amb 1/2 mile with no increased pain or soreness to achieve normal community ambulation              Status at last note/certification: Soreness with prolonged amb              Current Status: Progressing, 1/4 mi with decreased speed for gait focus, no pain 3/29/21               PLAN  [x]  Upgrade activities as tolerated     [x]  Continue plan of care  []  Update interventions per flow sheet       []  Discharge due to:_  []  Other:_      Chris Lorenzana PTA 3/31/2021  4:27 PM    Future Appointments   Date Time Provider Dianelys Rivera   4/5/2021  4:15 PM Bamhan Lanza PTA [facility]

## 2021-04-01 LAB
B BURGDOR IGG PATRN SER IB-IMP: NEGATIVE
B BURGDOR IGM PATRN SER IB-IMP: NEGATIVE
B BURGDOR18KD IGG SER QL IB: ABNORMAL
B BURGDOR23KD IGG SER QL IB: ABNORMAL
B BURGDOR23KD IGM SER QL IB: ABNORMAL
B BURGDOR28KD IGG SER QL IB: ABNORMAL
B BURGDOR30KD IGG SER QL IB: ABNORMAL
B BURGDOR39KD IGG SER QL IB: ABNORMAL
B BURGDOR39KD IGM SER QL IB: ABNORMAL
B BURGDOR41KD IGG SER QL IB: PRESENT
B BURGDOR41KD IGM SER QL IB: ABNORMAL
B BURGDOR45KD IGG SER QL IB: ABNORMAL
B BURGDOR58KD IGG SER QL IB: ABNORMAL
B BURGDOR66KD IGG SER QL IB: ABNORMAL
B BURGDOR93KD IGG SER QL IB: ABNORMAL

## 2021-04-01 NOTE — TELEPHONE ENCOUNTER
ePt has been made aware of her results. She knows of low vitamin d.        She is still concerned about her excessive bruising

## 2021-04-02 NOTE — TELEPHONE ENCOUNTER
Per dr Ewelina Olmedo referral has been placed to [facility] oncology. Referral and info has been faxed to their office. Fax confirmation received.

## 2021-04-05 ENCOUNTER — HOSPITAL ENCOUNTER (OUTPATIENT)
Dept: PHYSICAL THERAPY | Age: 26
Discharge: HOME | End: 2021-04-05
Payer: COMMERCIAL

## 2021-04-05 PROCEDURE — 97530 THERAPEUTIC ACTIVITIES: CPT

## 2021-04-05 PROCEDURE — 97110 THERAPEUTIC EXERCISES: CPT

## 2021-04-05 PROCEDURE — 97112 NEUROMUSCULAR REEDUCATION: CPT

## 2021-04-05 NOTE — PROGRESS NOTES
PT DAILY TREATMENT NOTE     Patient Name: Windsor Kussmaul  Date:2021  : 1995  [x]  Patient  Verified  Payor: Red [employer] / Plan: [address] [place] [address] / Product Type: PPO /    In time: 1400  Out time: 5893  Total Treatment Time (min): 47  Visit #: 4 of 8    Medicare/BCBS Only   Total Timed Codes (min):  47 1:1 Treatment Time:  47       Treatment Area:  Foot pain, right [M79.726]    SUBJECTIVE  Pain Level (0-10 scale): 0/10  Any medication changes, allergies to medications, adverse drug reactions, diagnosis change, or new procedure performed?: [x] No    [] Yes (see summary sheet for update)  Subjective functional status/changes:   [] No changes reported  Pt reports she stepped on a piece of glass on accident with kitchen remodel, otherwise good    OBJECTIVE    26 min Therapeutic Exercise:  [x]?????? See flow sheet : mobility and strengthening   Rationale: increase ROM, increase strength and improve coordination to improve the patients ability to tolerate increased activity     11 min Therapeutic Activity:  [x]??????  See flow sheet : walking, squatting, functional movement/strengthening   Rationale: increase strength, improve coordination and Imporoved gait pattern  to improve the patients ability to improve walking and standing tolerance    10 min Neuromuscular Re-education:  [x]  See flow sheet : balance, SLS, foam   Rationale: increase strength, improve coordination and improve balance  to improve the patients ability to improve balance          With   [x] TE   [x] TA   [] neuro   [] other: Patient Education: [x] Review HEP    [] Progressed/Changed HEP based on:   [x] positioning   [] body mechanics   [] transfers   [] heat/ice application    [] other:      Other Objective/Functional Measures:  --improved tolerance for ecc focus on HR  --ORG for side step/ monster walk   --bridging on SB, consider adding curl as tolerated  --MMT right hip abd 4-/5    Consider: weighted squat, tband for clams, SL pickup off ground    Pain Level (0-10 scale) post treatment: 0/10    ASSESSMENT/Changes in Function: Pt responds well to familiar session, able to tolerate progressed activities wit no noted symptoms outside of muscle fatigue. Pt reports she feels her only remaining limitation is the ability to go up on the balls of her feet. Patient will continue to benefit from skilled PT services to modify and progress therapeutic interventions, address functional mobility deficits, address ROM deficits, address strength deficits, analyze and address soft tissue restrictions, analyze and cue movement patterns, analyze and modify body mechanics/ergonomics and assess and modify postural abnormalities to attain remaining goals.      []  See Plan of Care  []  See progress note/recertification  []  See Discharge Summary         Progress towards goals / Updated goals:  1.  Pt will report 90% improvement to regularly perform ADLs with ease              Status at last note/certification: 85%              Current Status: MET pt reports 90% improvement 4/5/21            2.  Pt will demonstrate AROM IV at least 20 deg and EV at least 10 deg for improved ankle strategy for safer SL balance              Status at last note/certification: Progressing at IV 20*, EV 9*              Current Status:             3.  Pt will score FOTO 68 or more to demonstrate improved functional ability with daily tasks.              Status at last note/certification: Progressing - 65              Current Status:             4.  Pt will be able to amb 1/2 mile with no increased pain or soreness to achieve normal community ambulation              Status at last note/certification: Soreness with prolonged amb              Current Status: Progressing, 1/4 mi 2.0-2.2 in 7'06\" improved gait 4/5/21  PLAN  [x]  Upgrade activities as tolerated     [x]  Continue plan of care  []  Update interventions per flow sheet       []  Discharge due to:_  []  Other:_ Luis Samuel, PTA 4/5/2021  1:59 PM    Future Appointments   Date Time Provider Dianelys Rivera   4/5/2021  2:00 PM Shemar Osuna, [place] [facility]   4/7/2021  4:15 PM Shemar Osuna, [place] [facility]   4/13/2021  4:15 PM Elijah Jordan, PT [facility]   4/15/2021  4:15 PM Buena Kemp [facility]   4/20/2021  4:15 PM Buena Kemp [facility]   4/22/2021  4:15 PM Elijah Jordan, PT [facility]   4/27/2021  4:15 PM Elijah Jordan, PT [facility]

## 2021-04-07 ENCOUNTER — HOSPITAL ENCOUNTER (OUTPATIENT)
Dept: PHYSICAL THERAPY | Age: 26
Discharge: HOME | End: 2021-04-07
Payer: COMMERCIAL

## 2021-04-07 PROCEDURE — 97110 THERAPEUTIC EXERCISES: CPT

## 2021-04-07 PROCEDURE — 97116 GAIT TRAINING THERAPY: CPT

## 2021-04-07 PROCEDURE — 97112 NEUROMUSCULAR REEDUCATION: CPT

## 2021-04-07 PROCEDURE — 97530 THERAPEUTIC ACTIVITIES: CPT

## 2021-04-07 NOTE — PROGRESS NOTES
PT DAILY TREATMENT NOTE     Patient Name: Kaden Cunningham  Date:2021  : 1995  [x]  Patient  Verified  Payor: Moris Cummings / Plan: [address] [place] [address] / Product Type: PPO /    In time:4:25  Out time:5:35  Total Treatment Time (min): 70  Visit #: 5 of 8    Medicare/BCBS Only   Total Timed Codes (min):  70 1:1 Treatment Time:  70       Treatment Area: Foot pain, right [M79.671]    SUBJECTIVE  Pain Level (0-10 scale): 0  Any medication changes, allergies to medications, adverse drug reactions, diagnosis change, or new procedure performed?: [x] No    [] Yes (see summary sheet for update)  Subjective functional status/changes:   [] No changes reported  Just a little sore but doing all activity with no difficulty, just push through it. OBJECTIVE        23 min Therapeutic Exercise:  [x] See flow sheet :   Rationale: increase ROM, increase strength and improve coordination to improve the patients ability to perform usual activity    18 min Therapeutic Activity:  [x]  See flow sheet :   Rationale: increase ROM, increase strength and improve coordination  to improve the patients ability to tolerate usual activity     10 min Neuromuscular Re-education:  [x]  See flow sheet :   Rationale: increase ROM, increase strength, improve coordination, improve balance and increase proprioception  to improve the patients ability to tolerate increased function    5 min Manual Therapy: Ankle and forefoot mobs   The manual therapy interventions were performed at a separate and distinct time from the therapeutic activities interventions. Rationale: increase ROM and increase tissue extensibility to return to normal function    14 min Gait Training:   For right pre swing and swing phase   Rationale: to return to a normal gait pattern          With   [x] TE   [] TA   [] neuro   [] other: Patient Education: [x] Review HEP    [] Progressed/Changed HEP based on:   [] positioning   [] body mechanics   [] transfers [] heat/ice application    [] other:      Other Objective/Functional Measures:   - AROM Right Foot IV 18 EV 6, DF 25*  - Right hip instability with hip rotation during right toe off indicating an out of sequence pelvic sway  - Gait training to limit pelvic rotation out of sequence     Pain Level (0-10 scale) post treatment: 0    ASSESSMENT/Changes in Function:   - Good response to gait training with improved (B) pelvic sway after treatment    Patient will continue to benefit from skilled PT services to modify and progress therapeutic interventions, address functional mobility deficits, address ROM deficits, address strength deficits, analyze and address soft tissue restrictions and analyze and cue movement patterns to attain remaining goals.      []  See Plan of Care  []  See progress note/recertification  []  See Discharge Summary         Progress towards goals / Updated goals:  1.  Pt will report 90% improvement to regularly perform ADLs with ease              Status at last note/certification: 85%              Current Status: MET pt reports 90% improvement 4/5/21            2.  Pt will demonstrate AROM IV at least 20 deg and EV at least 10 deg for improved ankle strategy for safer SL balance              Status at last note/certification: Progressing at IV 20*, EV 9*              Current Status: Progressing at IV 18* EV6* 4/7/21            3.  Pt will score FOTO 68 or more to demonstrate improved functional ability with daily tasks.              Status at last note/certification: Progressing - 65              Current Status:             4.  Pt will be able to amb 1/2 mile with no increased pain or soreness to achieve normal community ambulation              Status at last note/certification: Soreness with prolonged amb              Current Status: Met at 1/2 mile in 12'4\" 4/7/21    PLAN  [x]  Upgrade activities as tolerated     [x]  Continue plan of care  []  Update interventions per flow sheet       []  Discharge due to:_  []  Other:_      Adin Lord, PT 4/7/2021  5:09 PM    Future Appointments   Date Time Provider Dianelys Rivera   4/13/2021  4:15 PM Marivel Clifford, PT [facility]   4/15/2021  4:15 PM James Ureña [facility]   4/20/2021  4:15 PM Kwan Thornton [facility]   4/22/2021  4:15 PM Marivel Clifford, PT [facility]   4/27/2021  4:15 PM Marivel Clifford, PT [facility]

## 2021-04-13 ENCOUNTER — HOSPITAL ENCOUNTER (OUTPATIENT)
Dept: PHYSICAL THERAPY | Age: 26
Discharge: HOME | End: 2021-04-13
Payer: COMMERCIAL

## 2021-04-13 PROCEDURE — 97110 THERAPEUTIC EXERCISES: CPT

## 2021-04-13 PROCEDURE — 97530 THERAPEUTIC ACTIVITIES: CPT

## 2021-04-13 PROCEDURE — 97116 GAIT TRAINING THERAPY: CPT

## 2021-04-13 NOTE — PROGRESS NOTES
PT DAILY TREATMENT NOTE     Patient Name: Gin Vega  Date:2021  : 1995  [x]  Patient  Verified  Payor: BLUE CROSS / Plan: [address] [place] [address] / Product Type: PPO /    In time:4:14  Out time: 5:08  Total Treatment Time (min): 54  Visit #: 6 of 8    Medicare/BCBS Only   Total Timed Codes (min):  54 1:1 Treatment Time:  54       Treatment Area: Foot pain, right [M79.671]    SUBJECTIVE  Pain Level (0-10 scale): 0  Any medication changes, allergies to medications, adverse drug reactions, diagnosis change, or new procedure performed?: [x] No    [] Yes (see summary sheet for update)  Subjective functional status/changes:   [] No changes reported  Just a little sore after [place] Hips feel better with walking but the right foot is a little sore. OBJECTIVE        24 min Therapeutic Exercise:  [x] See flow sheet :   Rationale: increase ROM, increase strength and improve coordination to improve the patients ability to perform usual activity    15 min Therapeutic Activity:  [x]  See flow sheet :   Rationale: increase ROM, increase strength and improve coordination  to improve the patients ability to tolerate usual activity     15 min Gait Training:   To increase right forefoot function with ambulation   Rationale: to return to a normal gait pattern          With   [x] TE   [] TA   [] neuro   [] other: Patient Education: [x] Review HEP    [] Progressed/Changed HEP based on:   [] positioning   [] body mechanics   [] transfers   [] heat/ice application    [] other:      Other Objective/Functional Measures:   - Add dynamic heel raise and genaro walk to aid with forefoot function with normal ambulation  - - Dynamic heel raises with step into heel raise with opposite high knee drive  - - Genaro walk with extended left LE stride with right foot push off      Pain Level (0-10 scale) post treatment: 0    ASSESSMENT/Changes in Function:   - Good response to gait training with improved (B) pelvic sway after treatment  - Significant improvement of pelvic function during gait    Patient will continue to benefit from skilled PT services to modify and progress therapeutic interventions, address functional mobility deficits, address ROM deficits, address strength deficits, analyze and address soft tissue restrictions and analyze and cue movement patterns to attain remaining goals.      []  See Plan of Care  []  See progress note/recertification  []  See Discharge Summary         Progress towards goals / Updated goals:  1.  Pt will report 90% improvement to regularly perform ADLs with ease              Status at last note/certification: 85%              Current Status: MET pt reports 90% improvement 4/5/21            2.  Pt will demonstrate AROM IV at least 20 deg and EV at least 10 deg for improved ankle strategy for safer SL balance              Status at last note/certification: Progressing at IV 20*, EV 9*              Current Status: Progressing at IV 18* EV6* 4/7/21            3.  Pt will score FOTO 68 or more to demonstrate improved functional ability with daily tasks.              Status at last note/certification: Progressing - 65              Current Status:             4.  Pt will be able to amb 1/2 mile with no increased pain or soreness to achieve normal community ambulation              Status at last note/certification: Soreness with prolonged amb              Current Status: Met at 1/2 mile in 13'44\" 4/13/21    PLAN  [x]  Upgrade activities as tolerated     [x]  Continue plan of care  []  Update interventions per flow sheet       []  Discharge due to:_  []  Other:_      Rosette Wild, PT 4/13/2021  5:09 PM    Future Appointments   Date Time Provider Department Center   4/15/2021  4:15 PM Carmen Villalpando [facility]   4/20/2021  4:15 PM Kwan Smith [facility]   4/22/2021  4:15 PM Upton Oas, PT [facility]   4/27/2021  4:15 PM Upton Oas, PT [id number]

## 2021-04-15 ENCOUNTER — HOSPITAL ENCOUNTER (OUTPATIENT)
Dept: PHYSICAL THERAPY | Age: 26
Discharge: HOME | End: 2021-04-15
Payer: COMMERCIAL

## 2021-04-15 PROCEDURE — 97116 GAIT TRAINING THERAPY: CPT

## 2021-04-15 PROCEDURE — 97112 NEUROMUSCULAR REEDUCATION: CPT

## 2021-04-15 PROCEDURE — 97110 THERAPEUTIC EXERCISES: CPT

## 2021-04-15 NOTE — PROGRESS NOTES
PT DAILY TREATMENT NOTE     Patient Name: Windsor Kussmaul  Date:4/15/2021  : 1995  [x]  Patient  Verified  Payor: BLUE [employer]OSS / Plan: [address] [place] [address] / Product Type: PPO /    In time: 8051  Out time: 807  Total Treatment Time (min): 50  Visit #: 7 of 8    Medicare/BCBS Only   Total Timed Codes (min):  50 1:1 Treatment Time:  50       Treatment Area: Foot pain, right [M79.671]    SUBJECTIVE  Pain Level (0-10 scale): 0/10  Any medication changes, allergies to medications, adverse drug reactions, diagnosis change, or new procedure performed?: [x] No    [] Yes (see summary sheet for update)  Subjective functional status/changes:   [x] No changes reported    OBJECTIVE    25 min Therapeutic Exercise:  [x]??????? See flow sheet : mobility and strengthening   Rationale: increase ROM, increase strength and improve coordination to improve the patients ability to tolerate increased activity     10 min Neuromuscular Re-education:  [x]? See flow sheet : balance, SLS, foam, rebounder ball toss   Rationale: increase strength, improve coordination and improve balance  to improve the patients ability to improve balance    15 min Gait Training:  Genaro walkover/push off, dynamic walking heel raise   Rationale: achieve normal gait pattern          With   [x] TE   [x] TA   [] neuro   [] other: Patient Education: [x] Review HEP    [] Progressed/Changed HEP based on:   [] positioning   [] body mechanics   [] transfers   [] heat/ice application    [] other:      Other Objective/Functional Measures:   --fwd/lat rebounder toss  --initiated steamboats with OTB  --amb 1/2 mi in 13'03\" at 2.3 speed     Pain Level (0-10 scale) post treatment: 0    ASSESSMENT/Changes in Function: Pt responds well, able to demonstrate improved gait pattern.      Patient will continue to benefit from skilled PT services to modify and progress therapeutic interventions, address functional mobility deficits, address ROM deficits, address strength deficits, analyze and address soft tissue restrictions, analyze and cue movement patterns, analyze and modify body mechanics/ergonomics and assess and modify postural abnormalities to attain remaining goals.      []  See Plan of Care  []  See progress note/recertification  []  See Discharge Summary         Progress towards goals / Updated goals:  1.  Pt will report 90% improvement to regularly perform ADLs with ease              Status at last note/certification: 85%              Current Status: MET pt reports 90% improvement 4/5/21            2.  Pt will demonstrate AROM IV at least 20 deg and EV at least 10 deg for improved ankle strategy for safer SL balance              Status at last note/certification: Progressing at IV 20*, EV 9*              Current Status: Progressing at IV 18* EV6* 4/7/21            3.  Pt will score FOTO 68 or more to demonstrate improved functional ability with daily tasks.              Status at last note/certification: Progressing - 65              Current Status:             4.  Pt will be able to amb 1/2 mile with no increased pain or soreness to achieve normal community ambulation              Status at last note/certification: Soreness with prolonged amb              Current Status: MET at 1/2 mile in 13'44\" 4/13/21    PLAN  [x]  Upgrade activities as tolerated     [x]  Continue plan of care  []  Update interventions per flow sheet       []  Discharge due to:_  []  Other:_      Beatrice Carias PTA 4/15/2021  4:26 PM    Future Appointments   Date Time Provider Dianelys Rivera   4/20/2021  4:15 PM Hilario Rajan [facility]   4/22/2021  4:15 PM Iesha Del Valle PT [facility]   4/27/2021  4:15 PM Iesha Del Valle PT [facility]

## 2021-04-20 ENCOUNTER — HOSPITAL ENCOUNTER (OUTPATIENT)
Dept: PHYSICAL THERAPY | Age: 26
Discharge: HOME | End: 2021-04-20
Payer: COMMERCIAL

## 2021-04-20 PROCEDURE — 97116 GAIT TRAINING THERAPY: CPT

## 2021-04-20 PROCEDURE — 97112 NEUROMUSCULAR REEDUCATION: CPT

## 2021-04-20 PROCEDURE — 97110 THERAPEUTIC EXERCISES: CPT

## 2021-04-20 NOTE — PROGRESS NOTES
PT DAILY TREATMENT NOTE     Patient Name: Phi Alexandre  Date:2021  : 1995  [x]  Patient  Verified  Payor: BLUE CROSS / Plan: [address] [place] [address] / Product Type: PPO /    In time: 1409  Out time: 0016  Total Treatment Time (min): 47  Visit #: 8 of 8    Medicare/BCBS Only   Total Timed Codes (min):  47 1:1 Treatment Time:  47       Treatment Area: Foot pain, right [M79.671]    SUBJECTIVE  Pain Level (0-10 scale): 0/10  Any medication changes, allergies to medications, adverse drug reactions, diagnosis change, or new procedure performed?: [x] No    [] Yes (see summary sheet for update)  Subjective functional status/changes:   [] No changes reported  Pt is doing well, feels that she still has some strength and endurance progress to make. Pt was on feet all day on Saturday leading to swelling and some pain, which ice helped. OBJECTIVE    22 min Therapeutic Exercise:  [x]???????? See flow sheet : mobility and strengthening, muscular endurance   Rationale: increase ROM, increase strength and improve coordination to improve the patients ability to tolerate increased activity     15 min Neuromuscular Re-education:  [x]? ?  See flow sheet : balance, SLS, foam, rebounder ball toss   Rationale: increase strength, improve coordination and improve balance  to improve the patients ability to improve balance     10 min Gait Training:  Genaro walkover/push off, dynamic walking heel raise   Rationale: achieve normal gait pattern          With   [x] TE   [] TA   [] neuro   [] other: Patient Education: [x] Review HEP    [] Progressed/Changed HEP based on:   [x] positioning   [] body mechanics   [] transfers   [] heat/ice application    [x] other: stretching to reduce soreness     Other Objective/Functional Measures:   Perceived Improvements: gait pattern, gastroc strength, walking tolerance, walking on uneven ground   Perceived Deficits: gastroc/PF strength, muscular endurance around the ankle, overall increased tolerance for activity without soreness, pt has pain when she is on her feet all day    Pain at best: 0/10  Pain at worst: 2/10  Pain on average: 0/10    Rated improvement since SOC: 90%    ROM: INV 13*, EV 10*  FOTO score 69 today    --added HR/TR pulse 20\"x3 ea  --added toe walking, heel walking  --HR performed off step for increased ROM with ecc focus  --DL up, SL down HR  --soleus HR with knees bent      Pain Level (0-10 scale) post treatment: 0-0.5/10 \"sore and tight\"    ASSESSMENT/Changes in Function: Pt has responded well to skilled PT POC, able to ambulate with an improved gait pattern and tolerate more activity. Pt has also experienced an increase in ROM and can now tolerate more physical activity, only noting soreness and pain when she walks or is standing for long periods. Pt wishes to be able to gain more plantar flexion strength and overall more activity tolerance and reduce soreness after activity and reports 90% improvement since [facility]. Pt would benefit from continued skilled PT to address remaining strength and endurance limitations. Patient will continue to benefit from skilled PT services to modify and progress therapeutic interventions, address functional mobility deficits, address ROM deficits, address strength deficits, analyze and address soft tissue restrictions, analyze and cue movement patterns, analyze and modify body mechanics/ergonomics and assess and modify postural abnormalities to attain remaining goals.      []  See Plan of Care  []  See progress note/recertification  []  See Discharge Summary         Progress towards goals / Updated goals:  1.  Pt will report 90% improvement to regularly perform ADLs with ease              Status at last note/certification: 85%              Current Status: MET pt reports 90% improvement 4/20/21           2.  Pt will demonstrate AROM IV at least 20 deg and EV at least 10 deg for improved ankle strategy for safer SL balance              Status at last note/certification: Progressing at IV 20*, EV 9*              VCXNNOB Status: Progressing at IV 13*, EV 10* however pts ankle feels tight from therex 4/20/21         3.  Pt will score FOTO 68 or more to demonstrate improved functional ability with daily tasks.              Status at last note/certification: Progressing - 65              Current Status:  MET score 69 today 4/20/21           4.  Pt will be able to amb 1/2 mile with no increased pain or soreness to achieve normal community ambulation              Status at last note/certification: Soreness with prolonged amb              Current Status: MET at 1/2 mile in 13'44\" 4/13/21       PLAN  [x]  Upgrade activities as tolerated     [x]  Continue plan of care  []  Update interventions per flow sheet       []  Discharge due to:_  []  Other:_      Lenora Ferrari, PTA 4/20/2021  4:27 PM    Future Appointments   Date Time Provider Dianelys Rivera   4/22/2021  4:15 PM Wilton Naranjo, PT [facility]   4/27/2021  4:15 PM Wilton Naranjo, PT [facility]

## 2021-04-20 NOTE — PROGRESS NOTES
[facility] at [address]  Phone: [phone]      Fax:  [phone]    Progress Note  Patient name: Cecily Valadez Start of Care: 2021   Referral Nura Caldwell PA-C : 1995                Medical Diagnosis: Foot pain, right [M79.671]  Payor: [employer] / Plan: [address] [place] [address] / Product Type: PPO /  Onset Date:20                Treatment Diagnosis: Right foot pain (s/p CCT arthrodesis)   Prior Hospitalization: see medical history Provider#: [id number]   Medications: Verified on Patient summary List   Comorbidities: Previous foot and ankle surgeries (, , ), Mitral/tricuspid regurgitation, atopic dermatitis, latex allergy, early osteoporosis  Prior Level of Function: functionally I with daily tasks.     Visits since [facility]: 29   Missed Visits: 0    Established Goals:          Excellent Good         Limited           None  [x] Increased ROM   []  [x]  []  []  [x] Increased Strength  []  [x]  []  []  [x] Increased Mobility  [x]  []  []  []   [x] Decreased Pain   [x]  []  []  []  [x] Increased Endurance  []  []  [x]  []    Key Functional Changes:   Perceived Improvements: gait pattern, gastroc strength, walking tolerance, walking on uneven ground   Perceived Deficits: gastroc/PF strength, muscular endurance around the ankle, overall increased tolerance for activity without soreness, pt has pain when she is on her feet all day     Pain at best: 0/10  Pain at worst: 2/10  Pain on average: 0/10     Rated improvement since SOC: 90%     ROM: INV 13*, EV 10*    FOTO score 69 today  Current Goals:  1.  Pt will report 90% improvement to regularly perform ADLs with ease              Status at last note/certification: 85%              Current Status: MET pt reports 90% improvement 21           2.  Pt will demonstrate AROM IV at least 20 deg and EV at least 10 deg for improved ankle strategy for safer SL balance              Status at last note/certification: Progressing at IV 20*, EV 9*              QQBKRPZ Status: Progressing at IV 13*, EV 10* however pts ankle feels tight from therex 4/20/21         3.  Pt will score FOTO 68 or more to demonstrate improved functional ability with daily tasks.              Status at last note/certification: Progressing - 65              Current Status:  MET score 69 today 4/20/21           4.  Pt will be able to amb 1/2 mile with no increased pain or soreness to achieve normal community ambulation              Status at last note/certification: Soreness with prolonged amb              Current Status: MET at 1/2 mile in 13'44\" 4/13/21     Updated Goals: to be achieved in 8 visits:  1. Pt will be able to walk 1/2 mile with good gait pattern and little to no discomfort/ soreness in less than 11 min to progress to normal community ambulation              Status at last note/certification: 1/2 mile in 13'44\"              Current Status:   2. Pt will be able to perform 10 single leg heel raises to demonstrate adequate plantar flexion strength for daily functional activities              Status at last note/certification: Requires HHA for SL HR              Current Status:   3. Pt will demonstrate AROM IV at least 20 deg and EV at least 10 deg for improved ankle strategy for safer SL balance              Status at last note/certification: Progressing at [facility] EV 10* however pts ankle feels tight from therex               Current Status:     ASSESSMENT/RECOMMENDATIONS: Pt has responded well to skilled PT POC, able to ambulate with an improved gait pattern and tolerate more activity. Pt has also experienced an increase in ROM and can now tolerate more physical activity, only noting soreness and pain when she walks or is standing for long periods.  Pt wishes to be able to gain more plantar flexion strength and overall more activity tolerance and reduce soreness after activity and reports 90% improvement since [facility]. Pt would benefit from continued skilled PT to address remaining strength and endurance limitations. [x]Continue therapy per initial plan/protocol at a frequency of  2 x per week for 8 visits  []Continue therapy with the following recommended changes:_____________________      _____________________________________________________________________  []Discontinue therapy progressing towards or have reached established goals  []Discontinue therapy due to lack of appreciable progress towards goals  []Discontinue therapy due to lack of attendance or compliance  []Await Physician's recommendations/decisions regarding therapy  []Other:________________________________________________________________    Thank you for this referral.   Ellie Shepherd, PTA 4/20/2021 5:10 PM  Abel Oquendo, PT 4/20/2021 5:20 PM  NOTE TO PHYSICIAN:  PLEASE COMPLETE THE ORDERS BELOW AND   FAX TO [facility]: [phone]  If you are unable to process this request in 24 hours please contact our office:   [phone]  []  I have read the above report and request that my patient continue as recommended. []  I have read the above report and request that my patient continue therapy with the following changes/special instructions:________________________________________  []I have read the above report and request that my patient be discharged from therapy.     [de-identified] Signature:____________Date:_________TIME:________     Haroon Whittington PA-C  ** Signature, Date and Time must be completed for valid certification **

## 2021-04-22 ENCOUNTER — APPOINTMENT (OUTPATIENT)
Dept: PHYSICAL THERAPY | Age: 26
End: 2021-04-22
Payer: COMMERCIAL

## 2021-04-27 ENCOUNTER — HOSPITAL ENCOUNTER (OUTPATIENT)
Dept: PHYSICAL THERAPY | Age: 26
Discharge: HOME | End: 2021-04-27
Payer: COMMERCIAL

## 2021-04-27 PROCEDURE — 97530 THERAPEUTIC ACTIVITIES: CPT

## 2021-04-27 PROCEDURE — 97110 THERAPEUTIC EXERCISES: CPT

## 2021-04-27 PROCEDURE — 97112 NEUROMUSCULAR REEDUCATION: CPT

## 2021-04-27 NOTE — PROGRESS NOTES
Physical Therapy Discharge Instructions      [facility] at [address] [facility] [address] [facility], [address] [place] [address]  Phone: [phone]      Fax:  [phone]    Patient: Neita Kehr  : 1995      Continue Home Exercise Program 3 times per week      Continue with    [x] Ice  as needed      [] Heat           Follow up with MD:     [] Upon completion of therapy     [x] As needed      Recommendations:     [x]   Return to activity with home program    []   Return to activity with the following modifications:       []Post Rehab Program    []Join Independent aquatic program     []Return to/join local gym    Additional Comments: Thank you for choosing us to aid with your rehabilitation.     Yadira Banuelos, PT 2021 5:11 PM

## 2021-04-27 NOTE — PROGRESS NOTES
PT DISCHARGE DAILY NOTE AND [id number]    Patient name: Cecily Valadez Start of Care: 2021   Referral Sonny Krishnan PA-C : 1995                Medical Diagnosis: Foot pain, right [M79.671]  Payor: DAYA CERDA / Plan: [employer] / Product Type: PPO /  Onset Date:20                Treatment Diagnosis: Right foot pain (s/p CCT arthrodesis)   Prior Hospitalization: see medical history Provider#: [id number]   Medications: Verified on Patient summary List   Comorbidities: Previous foot and ankle surgeries (, , ), Mitral/tricuspid regurgitation, atopic dermatitis, latex allergy, early osteoporosis  Prior Level of Function: functionally I with daily tasks. Visits from Start of Care: 30    Missed Visits: 0    Reporting Period : 21 to 21    Date:2021  : 1995  [x]  Patient  Verified  Payor: aJnia Richey / Plan: [employer] / Product Type: PPO /    In time:4:27  Out time:5:25  Total Treatment Time (min): 58  Visit #:  10          30 min Therapeutic Exercise:  [x]? See flow sheet : DC assessment   Rationale: increase ROM, increase strength and improve coordination to improve the patients ability to perform normal activity     11 min Therapeutic Activity:  [x]? See flow sheet :   Rationale: increase strength, improve coordination, improve balance and increase proprioception  to improve the patients ability to improve daily function     10 min Neuromuscular Re-education:  [x]? See flow sheet :   Rationale: increase strength, improve coordination, improve balance and increase proprioception  to improve the patients ability to return to normal activity     8 min Manual Therapy: Ankle Mobs all planes   The manual therapy interventions were performed at a separate and distinct time from the therapeutic activities interventions.   Rationale: decrease pain, increase ROM and increase tissue extensibility to improve daily function                                                                 With   [x]? TE   []? TA   []? neuro   []? other: Patient Education: [x]? Review HEP    []? Progressed/Changed HEP based on:   []? positioning   []? body mechanics   []? transfers   []? heat/ice application    []? other:       Other Objective/Functional Measures:   - MMT Right ankle PF 5-   DF 5   IV 4   EV 5  - AROM Right Ankle PF 42   DF 15  IV 12   EV 12  - Pt able to perform dble HR no support  - Difficulty with performing unilateral HR unsupported  - Improved gait with near symmetrical pelvic sway with ambulation     Access Code: [email or web address]: [email or web address]/Date: 04/27/2021Prepared by: Vijaya Anthony   · Single Leg Stance - 1 x daily - 7 x weekly - 10 reps - 3 sets   · Towel Scrunches - 1 x daily - 7 x weekly - 10 reps - 3 sets   · Arch Lifting - 1 x daily - 7 x weekly - 10 reps - 3 sets   · Seated Toe Curl - 1 x daily - 7 x weekly - 10 reps - 3 sets   · Heel rises with counter support - 1 x daily - 7 x weekly - 10 reps - 3 sets   · Standing Eccentric Heel Raise - 1 x daily - 7 x weekly - 10 reps - 3 sets   · Single Leg Heel Raise - 1 x daily - 7 x weekly - 10 reps - 3 sets   · Lower Quarter Anterior Reach - 1 x daily - 7 x weekly - 10 reps - 3 sets   · Forward Reach with Dumbbell - 1 x daily - 7 x weekly - 10 reps - 3 sets   · Goblet Squat with Kettlebell - 1 x daily - 7 x weekly - 10 reps - 3 sets      Pain Level (0-10 scale) post treatment: 0    Summary of Care:  Updated Goals: to be achieved in 8 visits:  1. Pt will be able to walk 1/2 mile with good gait pattern and little to no discomfort/ soreness in less than 11 min to progress to normal community ambulation              Status at last note/certification: 1/2 mile in 13'44\"              Current Status: Met at 10'58\" 4/27/21  2.  Pt will be able to perform 10 single leg heel raises to demonstrate adequate plantar flexion strength for daily functional activities              Status at last note/certification: Requires HHA for SL HR              Current Status: Met at 10x with HHA 4/27/21  3. Pt will demonstrate AROM IV at least 20 deg and EV at least 10 deg for improved ankle strategy for safer SL balance              Status at last note/certification: Progressing at [facility] EV 10* however pts ankle feels tight from therex               UWHIJKW Status: Not Met but progressing at IV13* EV12* 4/27/21    ASSESSMENT/Changes in Function: Patient has shown good progress with this treatment program to include near symmetrical pelvic sway with gait. Pain as of last visit was 0/10. Patient has shown decreased pain and increased strength and mobility. Patient reports 95% improvement with overall involvement. FOTO score is 69. All STG/LTGs achieved as identified below.     Fall Risk Assessment: Patient demonstrates no Fall Risk     Thank you for this referral!      PLAN  [x]Discontinue therapy: [x]Patient has reached or is progressing toward set goals      []Patient is non-compliant or has abdicated      []Due to lack of appreciable progress towards set goals    Lakhwinder Frias, PT 4/27/2021  6:25 PM

## 2021-09-28 ENCOUNTER — OFFICE VISIT (OUTPATIENT)
Dept: FAMILY MEDICINE CLINIC | Age: 26
End: 2021-09-28
Payer: COMMERCIAL

## 2021-09-28 VITALS
OXYGEN SATURATION: 100 % | TEMPERATURE: 98.3 F | SYSTOLIC BLOOD PRESSURE: 112 MMHG | HEART RATE: 77 BPM | DIASTOLIC BLOOD PRESSURE: 70 MMHG | WEIGHT: 146 LBS | RESPIRATION RATE: 10 BRPM

## 2021-09-28 DIAGNOSIS — R23.3 BRUISES EASILY: Primary | ICD-10-CM

## 2021-09-28 DIAGNOSIS — F41.9 ANXIETY: ICD-10-CM

## 2021-09-28 PROCEDURE — 99213 OFFICE O/P EST LOW 20 MIN: CPT | Performed by: FAMILY MEDICINE

## 2021-09-28 NOTE — PROGRESS NOTES
Luma Schumacher is a 26 y.o. female  presents for vaccine concerns. She is very anxious about it. No chest pains or SOB    Allergies   Allergen Reactions    Latex Unable to Obtain    Doxycycline Rash and Palpitations       There is no problem list on file for this patient. No past medical history on file. Social History     Socioeconomic History    Marital status: SINGLE     Spouse name: Not on file    Number of children: Not on file    Years of education: Not on file    Highest education level: Not on file   Tobacco Use    Smoking status: Never Smoker    Smokeless tobacco: Never Used     Social Determinants of Health     Financial Resource Strain:     Difficulty of Paying Living Expenses:    Food Insecurity:     Worried About Running Out of Food in the Last Year:     [address] in the Last Year:    Transportation Needs:     Lack of Transportation (Medical):  Lack of Transportation (Non-Medical):    Physical Activity:     Days of Exercise per Week:     Minutes of Exercise per Session:    Stress:     Feeling of Stress :    Social Connections:     Frequency of Communication with Friends and Family:     Frequency of Social Gatherings with Friends and Family:     Attends [employer] Services:     Active Member of Clubs or Organizations:     Attends Club or Organization Meetings:     Marital Status:      No family history on file. Review of Systems   Constitutional: Negative for chills, fever, malaise/fatigue and weight loss. Eyes: Negative for blurred vision. Respiratory: Negative for cough, shortness of breath and wheezing. Cardiovascular: Negative for chest pain. Gastrointestinal: Negative for nausea and vomiting. Musculoskeletal: Negative for myalgias. Skin: Negative for rash. Neurological: Negative for weakness. Psychiatric/Behavioral: Negative for depression, hallucinations, substance abuse and suicidal ideas. The patient is nervous/anxious.  The patient does not have insomnia. Vitals:    09/28/21 1326   BP: 112/70   Pulse: 77   Resp: 10   Temp: 98.3 °F (36.8 °C)   SpO2: 100%   Weight: 146 lb (66.2 kg)   PainSc:   0 - No pain       Physical Exam  Vitals and nursing note reviewed. Constitutional:       Appearance: Normal appearance. She is normal weight. Neck:      Thyroid: No thyromegaly. Cardiovascular:      Rate and Rhythm: Normal rate and regular rhythm. Pulses: Normal pulses. Heart sounds: Normal heart sounds. Pulmonary:      Effort: Pulmonary effort is normal.      Breath sounds: Normal breath sounds. Musculoskeletal:         General: Normal range of motion. Cervical back: Normal range of motion and neck supple. Skin:     General: Skin is warm and dry. Neurological:      General: No focal deficit present. Mental Status: She is alert and oriented to person, place, and time. Psychiatric:         Mood and Affect: Mood normal.         Behavior: Behavior normal.         Thought Content: Thought content normal.         Judgment: Judgment normal.         Assessment/Plan      ICD-10-CM ICD-9-CM    1. Bruises easily  R23.8 782.9    2. Anxiety  F41.9 300.00      I have discussed the diagnosis with the patient and the intended plan of care as seen in the above orders. The patient has received an after-visit summary and questions were answered concerning future plans. I have discussed medication, side effects, and warnings with the patient in detail. The patient verbalized understanding and is in agreement with the plan of care. The patient will contact the office with any additional concerns.     lab results and schedule of future lab studies reviewed with patient    Verenice Deshpande MD

## 2021-09-28 NOTE — PATIENT INSTRUCTIONS
8 Common Questions About the COVID-19 Vaccine  Here are the answers to some questions and concerns that many people ask. Why should I get a COVID-19 vaccine? It will help protect you, protect others, and end the pandemic. Getting a vaccine will help you avoid catching COVID-19. (If you do catch it, your symptoms will most likely be less severe than if you hadn't gotten the vaccine.) Getting a vaccine also helps you protect the people around youpeople who could have serious problems if they catch the virus. Are COVID-19 vaccines safe? COVID-19 vaccines are safe and effective. They have been given to millions of people. The risk of serious problems is very low. Could I get COVID-19 from a vaccine? No, you can't get COVID-19 from a vaccine. The vaccines don't contain the COVID-19 virus, so they can't cause the disease. What are the side effects of COVID-19 vaccines? You might not have any side effects. If you do, they'll probably be a lot like the common side effects of other vaccinesa fever, fatigue, and soreness. (These are signs that your immune system is learning how to fight the virus.) The side effects don't last long, and they can be treated if they bother you. How many doses of a vaccine will I need? You may need 1 or 2 doses of a vaccine. And you might need \"booster\" doses later to help you stay protected. Do I need a vaccine if I've already had COVID-19? Yes. If you've had COVID-19, you may still be able to catch it again. Getting a vaccine will give you extra protection. Will I still need to wear a face mask after I get a vaccine? No and maybe. Once you are fully vaccinated, you can resume activities without wearing a mask unless required by other rules. Be sure to follow all instructions from your local health authorities and the CDC. Why not just get COVID-19 and skip a vaccine?   The risk of serious problems from the virus is much higher than the risk of serious problems from a vaccine. COVID-19 is unpredictable. Even if you're young and healthy, you could get very sick, have long-term health problems, or die. So it's much safer to get a vaccine than it is to catch COVID-19. The COVID-19 vaccines are one of the best to help stop the pandemic. So get vaccinated. Current as of: March 26, 2021               Content Version: 13.0  © 2006-2021 Healthwise, [employer]. Care instructions adapted under license by [employer] (which disclaims liability or warranty for this information). If you have questions about a medical condition or this instruction, always ask your healthcare professional. [place] [address] any warranty or liability for your use of this information.

## 2021-09-28 NOTE — PROGRESS NOTES
Pt in office to discuss COVID vaccine pros/cons. 1. \"Have you been to the ER, urgent care clinic since your last visit? Hospitalized since your last visit? \" No    2. \"Have you seen or consulted any other health care providers outside of the [address] [place] [address] since your last visit? \" No     3. For patients aged 39-70: Has the patient had a colonoscopy? No     If the patient is female:    4. For patients aged 41-77: Has the patient had a mammogram within the past 2 years? No    5. For patients aged 21-65: Has the patient had a pap smear?  No\

## 2021-11-04 ENCOUNTER — TELEPHONE (OUTPATIENT)
Dept: FAMILY MEDICINE CLINIC | Age: 26
End: 2021-11-04

## 2021-11-04 DIAGNOSIS — R23.3 BRUISES EASILY: Primary | ICD-10-CM

## 2021-11-04 NOTE — TELEPHONE ENCOUNTER
Patient called requesting a referral to Rheumatology  For bruising . Please review and advise, she can be reached at [phone].

## 2021-11-18 ENCOUNTER — TELEPHONE (OUTPATIENT)
Dept: FAMILY MEDICINE CLINIC | Age: 26
End: 2021-11-18

## 2021-11-18 NOTE — TELEPHONE ENCOUNTER
Patient called requesting a status on her referral submitted on 11/08/21. Please review and advise. Patient can be reached at [phone].

## 2022-01-20 ENCOUNTER — OFFICE VISIT (OUTPATIENT)
Age: 27
End: 2022-01-20
Payer: COMMERCIAL

## 2022-01-20 VITALS
RESPIRATION RATE: 11 BRPM | HEART RATE: 70 BPM | TEMPERATURE: 98.8 F | SYSTOLIC BLOOD PRESSURE: 103 MMHG | OXYGEN SATURATION: 100 % | DIASTOLIC BLOOD PRESSURE: 66 MMHG | BODY MASS INDEX: 23.95 KG/M2 | HEIGHT: 66 IN | WEIGHT: 149 LBS

## 2022-01-20 DIAGNOSIS — M19.071 OSTEOARTHRITIS OF RIGHT ANKLE, UNSPECIFIED OSTEOARTHRITIS TYPE: ICD-10-CM

## 2022-01-20 DIAGNOSIS — I73.00 RAYNAUD'S PHENOMENON WITHOUT GANGRENE: Primary | ICD-10-CM

## 2022-01-20 DIAGNOSIS — T14.8XXA BRUISING: ICD-10-CM

## 2022-01-20 DIAGNOSIS — M27.0 TORUS PALATINUS: ICD-10-CM

## 2022-01-20 PROCEDURE — 99205 OFFICE O/P NEW HI 60 MIN: CPT | Performed by: INTERNAL MEDICINE

## 2022-01-20 NOTE — PROGRESS NOTES
Hematology/Oncology  Progress Note    Name: Tay Loving  Date: 2022  : 1995  Primary Care Provider: Jonathan Carpenter MD    Ms. Kristy Stephen is a 23y.o. year old female with with tendency mules is limited mostly on lower legs and also occasionally the number of bruises 1 to 2-minute 3 on each leg. The bruises themselves were completely developed with the 1 to 2 cm or less and they tend to be content. They come and go patient is not sure of that associated with trauma. Patient has had several surgeries and she tends to bleed a little bit more than the usual she was told however this has not been a problem for the surgeries. Patient also bleeds a little longer when she cuts herself with a paper or shaving. However she did have formal bleeding time test and apparently that was within normal range. She has no mucosal lesions or bleeding. She does not have telangiectasia oral mucosa    Approximately a year or so ago she had an evaluation by hematology and no diagnosis was identified. When she was in high school she was told she has Raynaud's phenomena. I patient's cousin tends to have this included bruising as well patient's father and brother do not    The patient family is on both sides come from [facility]    She is not on any particular medications. She negative went to see a rheumatologist no dermatologist..    Current Therapy: No treatment diagnostic evaluation    Subjective: The past medical, surgical and social history has been reviewed and remains unchanged.    Past Medical History:   Diagnosis Date    Acid reflux     Anxiety     Osteoarthritis of foot, right     Raynaud's disease      Past Surgical History:   Procedure Laterality Date   Bianca Lather COALITION  2006    reconstruction 2012 Ankle fusion     HX TONSILLECTOMY  2004     Social History     Socioeconomic History    Marital status: SINGLE     Spouse name: Not on file    Number of children: Not on file    Years of education: Not on file    Highest education level: Not on file   Occupational History    Not on file   Tobacco Use    Smoking status: Never Smoker    Smokeless tobacco: Never Used   Vaping Use    Vaping Use: Never used   Substance and Sexual Activity    Alcohol use: Yes     Comment: wine 1-2 glasses per week x5 years    Drug use: Never    Sexual activity: Yes   Other Topics Concern    Not on file   Social History Narrative    Not on file     Social Determinants of Health     Financial Resource Strain:     Difficulty of Paying Living Expenses: Not on file   Food Insecurity:     Worried About Running Out of Food in the Last Year: Not on file    Joanna of Food in the Last Year: Not on file   Transportation Needs:     Lack of Transportation (Medical): Not on file    Lack of Transportation (Non-Medical):  Not on file   Physical Activity:     Days of Exercise per Week: Not on file    Minutes of Exercise per Session: Not on file   Stress:     Feeling of Stress : Not on file   Social Connections:     Frequency of Communication with Friends and Family: Not on file    Frequency of Social Gatherings with Friends and Family: Not on file    Attends Confucianism Services: Not on file    Active Member of [address] [place] [address] [employer] or Organizations: Not on file    Attends Club or Organization Meetings: Not on file    Marital Status: Not on file   Intimate Partner Violence:     Fear of Current or Ex-Partner: Not on file    Emotionally Abused: Not on file    Physically Abused: Not on file    Sexually Abused: Not on file   Housing Stability:     Unable to Pay for Housing in the Last Year: Not on file    Number of [place] in the Last Year: Not on file    Unstable Housing in the Last Year: Not on file     Family History   Problem Relation Age of Onset    Breast Cancer Maternal Aunt     Heart Disease Maternal Grandmother     Breast Cancer Maternal Grandmother      Current Outpatient Medications   Medication Sig Dispense Refill    etonogestreL 68 mg impl 68 mg once. NOT TAKING      ergocalciferol (Drisdol) 1,250 mcg (50,000 unit) capsule Take 1 Cap by mouth every seven (7) days. 12 Cap 1       Review of Systems:    General :The patient has no other  complaints and there is no physical distress evident. Psychological : patient denies having any psychological symptoms such as hallucinations depression or anxiety. Ophthalmic:the patient denies having any visual impairment or eye discomfort. ENT: there are no abnormalities reported. Allergy and Immunology:the patient denies having any seasonal allergies or allergies to medications other than those already outlined above. Hematological and Lymphatic: the patient denies having lymphadenopathy. Does have  Bruising with tenderness and slight increase in bleeding    Endocrine: the patient denies having any heat or cold intolerance. There is no history of diabetes or thyroid disorders. Breast: the patient denies having any history of breast mass or lumps. Respiratory:the patient denies having any cough, shortness of breath, or dyspnea on exertion. Cardiovascular: there are no complaints of chest pain, palpitations, chest pounding, or dyspnea on exertion. Gastrointestinal: the patient denies having nausea, emesis, diarrhea, constipation, or blood in the stool. Genito-Urinary: the patient denies having urinary urgency, frequency, or dysuria. Musculoskeletal: with the exception of mild arthralgias the patient has no other musculoskeletal complaints. Neurological:  denies having any numbness, tingling, or neurologic deficits. Dermatological: patient denies having any unexplained rash, skin ulcerations, or hives.     Objective:     Visit Vitals  /66   Pulse 70   Temp 98.8 °F (37.1 °C)   Resp 11   Ht 5' 6\" (1.676 m)   Wt 67.6 kg (149 lb)   LMP 11/24/2021 (Exact Date)   SpO2 100%   BMI 24.05 kg/m²     Pain Score: 3    Physical Exam:  With Mg Odonnell RN    ECO    General: Well appearing, in NAD    Psychologic: mood and affect are appropriate, no anxiety or depression noted    Skin: examination of the skin reveals no  rash or petechiae,  Bruising left thigh 2 cm  and right buttock 1 cm. Both tender to touch both flat and brownish not erythematous. HEENT: Normocephalic, atraumatic. Conjunctiva and sclera are clear. Pupils are equal, round and reactive to light. EOMs are intact. ENT without oral mucosal lesions, stomatitis or thrush    Neck: supple without lymphadenopathy, JVD or thyromegaly    Lymphatics: no palpable cervical, supraclavicular, infraclavicular, axillary or inguinal lymphadenopathy    Lungs: clear breath sounds bilaterally, no rhonchi or wheezes noted    Heart: S1-S2 noted. Abdomen: soft, non-tender, non-distended, no HSM    Extremities: without clubbing, cyanosis or edema, post op scar right ankle    Neurologic: no focal deficits, steady gait, Alert and oriented x 3. Laboratory Data:     No results found for this or any previous visit. There is no problem list on file for this patient. Assessment:     1. Raynaud's phenomenon without gangrene    2. Bruising    3. Osteoarthritis of right ankle, unspecified osteoarthritis type    4. Torus palatinus        Plan:     1. We shall obtain multiple blood tests to see if we identify a hint in hemostasis and platelet function. 2. Patient will return in 4 weeks to discuss the results of this test.  3. Patient will provide us with results of the previous testing. 4. If we do not have 25-year because of these bruising we shall refer the patient to rheumatology and then perhaps dermatology as well. 5. Patient had opportunity to ask questions which were answered.   6. Patient is in agreement with this plan    Follow-up and Dispositions  ·   Return in about 4 weeks (around 2022) for Follow up with labs, Follow_up In Person. Orders Placed This Encounter    CBC WITH AUTOMATED DIFF     Standing Status:   Future     Standing Expiration Date:   1/21/2023    IMMUNOGLOBULINS, G/A/M, QT.      Standing Status:   Future     Standing Expiration Date:   1/21/2023    IMMUNOELECTROPHORESIS [facility].)     Standing Status:   Future     Standing Expiration Date:   1/21/2023    FERRITIN     Standing Status:   Future     Standing Expiration Date:   1/21/2023    VITAMIN D, 25 HYDROXY     Standing Status:   Future     Standing Expiration Date:   1/21/2023    VITAMIN B12 & FOLATE     Standing Status:   Future     Standing Expiration Date:   1/21/2023    TSH+FREE T4     Standing Status:   Future     Standing Expiration Date:   1/21/2023    SPEP     Standing Status:   Future     Standing Expiration Date:   1/21/2023    SED RATE (ESR)     Standing Status:   Future     Standing Expiration Date:   1/21/2023    PTT     Standing Status:   Future     Standing Expiration Date:   1/21/2023    PROTHROMBIN TIME + INR     Standing Status:   Future     Standing Expiration Date:   1/21/2023    PLATELET AGGREGATION ([facility] ONLY)     Standing Status:   Future     Standing Expiration Date:   1/21/2023    PERIPHERAL SMEAR     Standing Status:   Future     Standing Expiration Date:   6/23/5370    METABOLIC PANEL, COMPREHENSIVE     Standing Status:   Future     Standing Expiration Date:   1/21/2023    IRON PROFILE     Standing Status:   Future     Standing Expiration Date:   1/21/2023    CARDIOLIPIN AB PANEL     Standing Status:   Future     Standing Expiration Date:   1/21/2023    PROTEIN S ANTIGEN     Standing Status:   Future     Standing Expiration Date:   1/21/2023    PROTEIN C, TOTAL     Standing Status:   Future     Standing Expiration Date:   1/21/2023    PROTEIN C ACTIVITY     Standing Status:   Future     Standing Expiration Date:   1/21/2023    FACTOR IX ACTIVITY     Standing Status:   Future     Standing Expiration Date:   1/20/2023  ANTITHROMBIN III PANEL     Standing Status:   Future     Standing Expiration Date:   2023    FREE LIGHT CHAINS, KAPPA/LAMBDA, QT     Standing Status:   Future     Standing Expiration Date:   2023   [facility] PANEL     Standing Status:   Future     Standing Expiration Date:   2023    FACTOR XIII     Standing Status:   Future     Standing Expiration Date:   2023    PREALBUMIN     Standing Status:   Future     Standing Expiration Date:   2023    C REACTIVE PROTEIN, QT     Standing Status:   Future     Standing Expiration Date:   2023    VITAMIN C, PLASMA     Standing Status:   Future     Standing Expiration Date:   2023    FACTOR V LEIDEN     Standing Status:   Future     Standing Expiration Date:   2023    FACTOR II  DNA ANALYSIS     Standing Status:   Future     Standing Expiration Date:   2023    etonogestreL 68 mg impl     Si mg once.  NOT TAKING       Jay Miller MD  2022

## 2022-01-24 ENCOUNTER — HOSPITAL ENCOUNTER (OUTPATIENT)
Dept: LAB | Age: 27
Discharge: HOME | End: 2022-01-24
Payer: COMMERCIAL

## 2022-01-24 DIAGNOSIS — T14.8XXA BRUISING: ICD-10-CM

## 2022-01-24 LAB
25(OH)D3 SERPL-MCNC: 21.1 NG/ML (ref 30–100)
ALBUMIN SERPL-MCNC: 4.5 G/DL (ref 3.4–5)
ALBUMIN/GLOB SERPL: 1.3 {RATIO} (ref 0.8–1.7)
ALP SERPL-CCNC: 75 U/L (ref 45–117)
ALT SERPL-CCNC: 23 U/L (ref 13–56)
ANION GAP SERPL CALC-SCNC: 6 MMOL/L (ref 3–18)
APTT PPP: 31.2 SEC (ref 23–36.4)
AST SERPL-CCNC: 18 U/L (ref 10–38)
BASOPHILS # BLD: 0 K/UL (ref 0–0.1)
BASOPHILS NFR BLD: 1 % (ref 0–2)
BILIRUB SERPL-MCNC: 1.1 MG/DL (ref 0.2–1)
BUN SERPL-MCNC: 12 MG/DL (ref 7–18)
BUN/CREAT SERPL: 17 (ref 12–20)
CALCIUM SERPL-MCNC: 9.5 MG/DL (ref 8.5–10.1)
CHLORIDE SERPL-SCNC: 111 MMOL/L (ref 100–111)
CO2 SERPL-SCNC: 24 MMOL/L (ref 21–32)
CREAT SERPL-MCNC: 0.7 MG/DL (ref 0.6–1.3)
CRP SERPL-MCNC: <0.3 MG/DL (ref 0–0.3)
DIFFERENTIAL METHOD BLD: ABNORMAL
EOSINOPHIL # BLD: 0.2 K/UL (ref 0–0.4)
EOSINOPHIL NFR BLD: 4 % (ref 0–5)
ERYTHROCYTE [DISTWIDTH] IN BLOOD BY AUTOMATED COUNT: 12.6 % (ref 11.6–14.5)
ERYTHROCYTE [SEDIMENTATION RATE] IN BLOOD: 12 MM/HR (ref 0–20)
FERRITIN SERPL-MCNC: 71 NG/ML (ref 8–388)
FOLATE SERPL-MCNC: 10.1 NG/ML (ref 3.1–17.5)
GLOBULIN SER CALC-MCNC: 3.5 G/DL (ref 2–4)
GLUCOSE SERPL-MCNC: 77 MG/DL (ref 74–99)
HCT VFR BLD AUTO: 39 % (ref 35–45)
HGB BLD-MCNC: 12.8 G/DL (ref 12–16)
IMM GRANULOCYTES # BLD AUTO: 0 K/UL (ref 0–0.04)
IMM GRANULOCYTES NFR BLD AUTO: 0 % (ref 0–0.5)
INR PPP: 1 (ref 0.8–1.2)
IRON SATN MFR SERPL: 50 % (ref 20–50)
IRON SERPL-MCNC: 176 UG/DL (ref 50–175)
LYMPHOCYTES # BLD: 2.2 K/UL (ref 0.9–3.6)
LYMPHOCYTES NFR BLD: 46 % (ref 21–52)
MCH RBC QN AUTO: 33.2 PG (ref 24–34)
MCHC RBC AUTO-ENTMCNC: 32.8 G/DL (ref 31–37)
MCV RBC AUTO: 101 FL (ref 78–100)
MONOCYTES # BLD: 0.4 K/UL (ref 0.05–1.2)
MONOCYTES NFR BLD: 9 % (ref 3–10)
NEUTS SEG # BLD: 2 K/UL (ref 1.8–8)
NEUTS SEG NFR BLD: 41 % (ref 40–73)
NRBC # BLD: 0 K/UL (ref 0–0.01)
NRBC BLD-RTO: 0 PER 100 WBC
PLATELET # BLD AUTO: 199 K/UL (ref 135–420)
PMV BLD AUTO: 10.2 FL (ref 9.2–11.8)
POTASSIUM SERPL-SCNC: 3.5 MMOL/L (ref 3.5–5.5)
PREALB SERPL-MCNC: 30.3 MG/DL (ref 20–40)
PROT SERPL-MCNC: 8 G/DL (ref 6.4–8.2)
PROTHROMBIN TIME: 13.5 SEC (ref 11.5–15.2)
RBC # BLD AUTO: 3.86 M/UL (ref 4.2–5.3)
SODIUM SERPL-SCNC: 141 MMOL/L (ref 136–145)
T4 FREE SERPL-MCNC: 0.9 NG/DL (ref 0.7–1.5)
TIBC SERPL-MCNC: 354 UG/DL (ref 250–450)
TSH SERPL DL<=0.05 MIU/L-ACNC: 1.41 UIU/ML (ref 0.36–3.74)
VIT B12 SERPL-MCNC: 404 PG/ML (ref 211–911)
WBC # BLD AUTO: 4.8 K/UL (ref 4.6–13.2)

## 2022-01-24 PROCEDURE — 82306 VITAMIN D 25 HYDROXY: CPT

## 2022-01-24 PROCEDURE — 80053 COMPREHEN METABOLIC PANEL: CPT

## 2022-01-24 PROCEDURE — 84165 PROTEIN E-PHORESIS SERUM: CPT

## 2022-01-24 PROCEDURE — 85025 COMPLETE CBC W/AUTO DIFF WBC: CPT

## 2022-01-24 PROCEDURE — 84439 ASSAY OF FREE THYROXINE: CPT

## 2022-01-24 PROCEDURE — 85303 CLOT INHIBIT PROT C ACTIVITY: CPT

## 2022-01-24 PROCEDURE — 82728 ASSAY OF FERRITIN: CPT

## 2022-01-24 PROCEDURE — 85730 THROMBOPLASTIN TIME PARTIAL: CPT

## 2022-01-24 PROCEDURE — 85250 CLOT FACTOR IX PTC/CHRSTMAS: CPT

## 2022-01-24 PROCEDURE — 85652 RBC SED RATE AUTOMATED: CPT

## 2022-01-24 PROCEDURE — 81241 F5 GENE: CPT

## 2022-01-24 PROCEDURE — 86140 C-REACTIVE PROTEIN: CPT

## 2022-01-24 PROCEDURE — 85610 PROTHROMBIN TIME: CPT

## 2022-01-24 PROCEDURE — 81240 F2 GENE: CPT

## 2022-01-24 PROCEDURE — 83540 ASSAY OF IRON: CPT

## 2022-01-24 PROCEDURE — 85240 CLOT FACTOR VIII AHG 1 STAGE: CPT

## 2022-01-24 PROCEDURE — 36415 COLL VENOUS BLD VENIPUNCTURE: CPT

## 2022-01-24 PROCEDURE — 86147 CARDIOLIPIN ANTIBODY EA IG: CPT

## 2022-01-24 PROCEDURE — 82180 ASSAY OF ASCORBIC ACID: CPT

## 2022-01-24 PROCEDURE — 82607 VITAMIN B-12: CPT

## 2022-01-24 PROCEDURE — 82784 ASSAY IGA/IGD/IGG/IGM EACH: CPT

## 2022-01-24 PROCEDURE — 85291 CLOT FACTOR XIII FIBRIN SCRN: CPT

## 2022-01-24 PROCEDURE — 85302 CLOT INHIBIT PROT C ANTIGEN: CPT

## 2022-01-24 PROCEDURE — 85305 CLOT INHIBIT PROT S TOTAL: CPT

## 2022-01-24 PROCEDURE — 85300 ANTITHROMBIN III ACTIVITY: CPT

## 2022-01-24 PROCEDURE — 83521 IG LIGHT CHAINS FREE EACH: CPT

## 2022-01-24 PROCEDURE — 84134 ASSAY OF PREALBUMIN: CPT

## 2022-01-25 LAB
AT III AG PPP IA-ACNC: 100 % (ref 72–124)
AT III PPP CHRO-ACNC: 125 % (ref 75–135)
FACT IX ACT/NOR PPP: 89 % (ref 60–177)
IGA SERPL-MCNC: 194 MG/DL (ref 70–400)
IGG SERPL-MCNC: 1170 MG/DL (ref 700–1600)
IGM SERPL-MCNC: 217 MG/DL (ref 40–230)
PROT C PPP-ACNC: 109 % (ref 73–180)
PROT S AG ACT/NOR PPP IA: 87 % (ref 60–150)
PROT S FREE AG ACT/NOR PPP IA: 116 % (ref 61–136)

## 2022-01-26 LAB
KAPPA LC FREE SER-MCNC: 13.5 MG/L (ref 3.3–19.4)
KAPPA LC FREE/LAMBDA FREE SER: 1.08 {RATIO} (ref 0.26–1.65)
LAMBDA LC FREE SERPL-MCNC: 12.5 MG/L (ref 5.7–26.3)
PROT C AG PPP IA-ACNC: 96 % (ref 60–150)
VIT C SERPL-MCNC: 1.2 MG/DL (ref 0.4–2)

## 2022-01-27 LAB
ALBUMIN SERPL ELPH-MCNC: 4.4 G/DL (ref 2.9–4.4)
ALBUMIN/GLOB SERPL: 1.4 {RATIO} (ref 0.7–1.7)
ALPHA1 GLOB SERPL ELPH-MCNC: 0.2 G/DL (ref 0–0.4)
ALPHA2 GLOB SERPL ELPH-MCNC: 0.6 G/DL (ref 0.4–1)
B-GLOBULIN SERPL ELPH-MCNC: 1 G/DL (ref 0.7–1.3)
CARDIOLIPIN IGA SER IA-ACNC: <9 APL U/ML (ref 0–11)
CARDIOLIPIN IGG SER IA-ACNC: <9 GPL U/ML (ref 0–14)
CARDIOLIPIN IGM SER IA-ACNC: 14 MPL U/ML (ref 0–12)
FACT VIII ACT/NOR PPP: 69 % (ref 56–140)
FACT XIII CLOT DIS 24H PPP QL: NORMAL
GAMMA GLOB SERPL ELPH-MCNC: 1.3 G/DL (ref 0.4–1.8)
GLOBULIN SER CALC-MCNC: 3.1 G/DL (ref 2.2–3.9)
INTERPRETATION, 910378, CSIR1: NORMAL
M PROTEIN SERPL ELPH-MCNC: NORMAL G/DL
PROT SERPL-MCNC: 7.5 G/DL (ref 6–8.5)
VWF AG ACT/NOR PPP IA: 56 % (ref 50–200)
VWF:RCO ACT/NOR PPP PL AGG: 71 % (ref 50–200)

## 2022-01-31 LAB — F5 GENE MUT ANL BLD/T: NORMAL

## 2022-02-01 LAB
F2 GENE MUT ANL BLD/T: NORMAL
IGA SERPL-MCNC: 204 MG/DL (ref 87–352)
IGG SERPL-MCNC: 1214 MG/DL (ref 586–1602)
IGM SERPL-MCNC: 240 MG/DL (ref 26–217)
PROT PATTERN SERPL IFE-IMP: ABNORMAL

## 2022-02-21 ENCOUNTER — OFFICE VISIT (OUTPATIENT)
Age: 27
End: 2022-02-21
Payer: COMMERCIAL

## 2022-02-21 VITALS
OXYGEN SATURATION: 100 % | BODY MASS INDEX: 23.88 KG/M2 | DIASTOLIC BLOOD PRESSURE: 71 MMHG | WEIGHT: 148.6 LBS | TEMPERATURE: 98.3 F | HEART RATE: 74 BPM | RESPIRATION RATE: 12 BRPM | HEIGHT: 66 IN | SYSTOLIC BLOOD PRESSURE: 107 MMHG

## 2022-02-21 DIAGNOSIS — E55.9 VITAMIN D DEFICIENCY: Primary | ICD-10-CM

## 2022-02-21 DIAGNOSIS — T14.8XXA BRUISING: ICD-10-CM

## 2022-02-21 DIAGNOSIS — I73.00 RAYNAUD'S PHENOMENON WITHOUT GANGRENE: ICD-10-CM

## 2022-02-21 PROCEDURE — 99214 OFFICE O/P EST MOD 30 MIN: CPT | Performed by: INTERNAL MEDICINE

## 2022-02-21 RX ORDER — ACETAMINOPHEN 500 MG
2000 TABLET ORAL 2 TIMES DAILY
Qty: 180 CAPSULE | Refills: 3 | Status: SHIPPED | OUTPATIENT
Start: 2022-02-21 | End: 2023-02-16

## 2022-02-21 NOTE — PROGRESS NOTES
Hematology/Oncology  Progress Note    Name: Juan Bishop  Date: 2022  : 1995  Primary Care Provider: Rashmi Camarillo MD    Ms. Rekha Winter is a 23y.o. year old female with easy bruising    Labs did not reveal etiology of the bruising    Perhaps rheumatologic or dermatologic source of bruising    Current Therapy: diagnostic evaluation    Subjective:     Ms. Rekha Winter is a 23y.o. year old female with with tendency mules is limited mostly on lower legs and also occasionally the number of bruises 1 to 2-minute 3 on each leg. The bruises themselves were completely developed with the 1 to 2 cm or less and they tend to be content. They come and go patient is not sure of that associated with trauma. Patient has had several surgeries and she tends to bleed a little bit more than the usual she was told however this has not been a problem for the surgeries. Patient also bleeds a little longer when she cuts herself with a paper or shaving. However she did have formal bleeding time test and apparently that was within normal range.     She has no mucosal lesions or bleeding. She does not have telangiectasia oral mucosa     Approximately a year or so ago she had an evaluation by hematology and no diagnosis was identified.     When she was in high school she was told she has Raynaud's phenomena.        I patient's cousin tends to have this included bruising as well patient's father and brother do not     The patient family is on both sides come from [facility]     She is not on any particular medications.     She negative went to see a rheumatologist no dermatologist..       The past medical, surgical and social history has been reviewed and remains unchanged.    Past Medical History:   Diagnosis Date    Acid reflux     Anxiety     Osteoarthritis of foot, right     Raynaud's disease      Past Surgical History:   Procedure Laterality Date   [facility]   reconstruction 2012 Ankle fusion 2020    HX TONSILLECTOMY  2004     Social History     Socioeconomic History    Marital status: SINGLE     Spouse name: Not on file    Number of children: Not on file    Years of education: Not on file    Highest education level: Not on file   Occupational History    Not on file   Tobacco Use    Smoking status: Never Smoker    Smokeless tobacco: Never Used   Vaping Use    Vaping Use: Never used   Substance and Sexual Activity    Alcohol use: Yes     Comment: wine 1-2 glasses per week x5 years    Drug use: Never    Sexual activity: Yes   Other Topics Concern    Not on file   Social History Narrative    Not on file     Social Determinants of Health     Financial Resource Strain:     Difficulty of Paying Living Expenses: Not on file   Food Insecurity:     Worried About Running Out of Food in the Last Year: Not on file    Joanna of Food in the Last Year: Not on file   Transportation Needs:     Lack of Transportation (Medical): Not on file    Lack of Transportation (Non-Medical):  Not on file   Physical Activity:     Days of Exercise per Week: Not on file    Minutes of Exercise per Session: Not on file   Stress:     Feeling of Stress : Not on file   Social Connections:     Frequency of Communication with Friends and Family: Not on file    Frequency of Social Gatherings with Friends and Family: Not on file    Attends Restorationist Services: Not on file    Active Member of [address] [place] [address] or Organizations: Not on file    Attends Club or Organization Meetings: Not on file    Marital Status: Not on file   Intimate Partner Violence:     Fear of Current or Ex-Partner: Not on file    Emotionally Abused: Not on file    Physically Abused: Not on file    Sexually Abused: Not on file   Housing Stability:     Unable to Pay for Housing in the Last Year: Not on file    Number of [place] in the Last Year: Not on file    Unstable Housing in the Last Year: Not on file     Family History   Problem Relation Age of Onset    Breast Cancer Maternal Aunt     Heart Disease Maternal Grandmother     Breast Cancer Maternal Grandmother      Current Outpatient Medications   Medication Sig Dispense Refill    cholecalciferol (VITAMIN D3) (2,000 UNITS /50 MCG) cap capsule Take 1 Capsule by mouth two (2) times a day for 360 days. Indications: low vitamin D levels 180 Capsule 3    etonogestreL 68 mg impl 68 mg once. NOT TAKING         Review of Systems:    General :The patient has no complaints and except for some leg bruising    Psychological : patient denies having any psychological symptoms such as hallucinations depression or anxiety. Ophthalmic:the patient denies having any visual impairment or eye discomfort. ENT: there are no abnormalities reported. Allergy and Immunology:the patient denies having any seasonal allergies or allergies to medications other than those already outlined above. Hematological and Lymphatic: the patient denies having any bruising, bleeding or lymphadenopathy. Endocrine: the patient denies having any heat or cold intolerance. There is no history of diabetes or thyroid disorders. Breast: the patient denies having any history of breast mass or lumps. Respiratory:the patient denies having any cough, shortness of breath, or dyspnea on exertion. Cardiovascular: there are no complaints of chest pain, palpitations, chest pounding, or dyspnea on exertion. Gastrointestinal: the patient denies having nausea, emesis, diarrhea, constipation, or blood in the stool. Genito-Urinary: the patient denies having urinary urgency, frequency, or dysuria. Musculoskeletal: with the exception of mild arthralgias the patient has no other musculoskeletal complaints. Neurological:  denies having any numbness, tingling, or neurologic deficits. Dermatological: patient denies having any unexplained rash, skin ulcerations, or hives.     Objective: Visit Vitals  /71   Pulse 74   Temp 98.3 °F (36.8 °C)   Resp 12   Ht 5' 6\" (1.676 m)   Wt 67.4 kg (148 lb 9.6 oz)   SpO2 100%   BMI 23.98 kg/m²     Pain Score: 0    Physical Exam:     ECO    General: Well appearing, in NAD    Psychologic: mood and affect are appropriate, no anxiety or depression noted    Skin: examination of the skin reveals no bruising, rash or petechiae    HEENT: Normocephalic, atraumatic. Conjunctiva and sclera are clear. Pupils are equal, round and reactive to light. EOMs are intact. ENT without oral mucosal lesions, stomatitis or thrush    Neck: supple without lymphadenopathy, JVD or thyromegaly    Lymphatics: no palpable cervical, supraclavicular, infraclavicular, axillary or inguinal lymphadenopathy    Lungs: clear breath sounds bilaterally, no rhonchi or wheezes noted    Heart: Regular rate and rhythm, no murmurs, rubs or gallops, S1-S2 noted. Positive peripheral pulses bilaterally upper and lower extremities    Abdomen: soft, non-tender, non-distended, no HSM, positive bowel sounds    Extremities: without clubbing, cyanosis or edema, some resolving bruises on legs    Neurologic: no focal deficits, steady gait, Alert and oriented x 3. Laboratory Data:     No results found for this or any previous visit. There is no problem list on file for this patient. Assessment:     1. Vitamin D deficiency    2. Bruising    3. Raynaud's phenomenon without gangrene        Plan:     1. We are referring patient to hematology and dermatology  2. We will request a gammopathy evaluation and cardiolipin antibody 8 evaluation. 3. Patient had opportunity to ask questions which were answered   4. patient will return for follow-up at 9 weeks with labs    Follow-up and Dispositions  ·   Return in about 9 weeks (around 2022) for Follow up with labs, Follow_up In Person.         Orders Placed This Encounter    CARDIOLIPIN AB PANEL     Standing Status:   Future     Standing Expiration Date:   2/22/2023    GAMMOPATHY EVAL, SPEP/HOLA, IG QT/FLC     Standing Status:   Future     Standing Expiration Date:   2/22/2023    REFERRAL TO RHEUMATOLOGY     Referral Priority:   Routine     Referral Type:   Consultation     Referral Reason:   Specialty Services Required     Referred to Provider:   Christiano Cruz MD     Requested Specialty:   Rheumatology     Number of Visits Requested:   1    REFERRAL TO DERMATOLOGY     Referral Priority:   Routine     Referral Type:   Consultation     Referral Reason:   Specialty Services Required     Referred to Provider:   Meaghan Ewnig MD     Requested Specialty:   Dermatology     Number of Visits Requested:   1    cholecalciferol (VITAMIN D3) (2,000 UNITS /50 MCG) cap capsule     Sig: Take 1 Capsule by mouth two (2) times a day for 360 days.  Indications: low vitamin D levels     Dispense:  180 Capsule     Refill:  3       Husam Grady MD  2/21/2022

## 2022-04-28 ENCOUNTER — HOSPITAL ENCOUNTER (OUTPATIENT)
Dept: LAB | Age: 27
Discharge: HOME | End: 2022-04-28

## 2022-04-28 DIAGNOSIS — I73.00 RAYNAUD'S PHENOMENON WITHOUT GANGRENE: ICD-10-CM

## 2022-04-29 ENCOUNTER — HOSPITAL ENCOUNTER (OUTPATIENT)
Dept: LAB | Age: 27
Discharge: HOME | End: 2022-04-29
Payer: COMMERCIAL

## 2022-04-29 PROCEDURE — 86147 CARDIOLIPIN ANTIBODY EA IG: CPT

## 2022-04-29 PROCEDURE — 36415 COLL VENOUS BLD VENIPUNCTURE: CPT

## 2022-04-29 PROCEDURE — 82784 ASSAY IGA/IGD/IGG/IGM EACH: CPT

## 2022-04-30 LAB
CARDIOLIPIN IGA SER IA-ACNC: <9 APL U/ML (ref 0–11)
CARDIOLIPIN IGG SER IA-ACNC: <9 GPL U/ML (ref 0–14)
CARDIOLIPIN IGM SER IA-ACNC: 19 MPL U/ML (ref 0–12)

## 2022-05-03 LAB
ALBUMIN SERPL ELPH-MCNC: 5.1 G/DL (ref 2.9–4.4)
ALBUMIN/GLOB SERPL: 1.5 {RATIO} (ref 0.7–1.7)
ALPHA1 GLOB SERPL ELPH-MCNC: 0.4 G/DL (ref 0–0.4)
ALPHA2 GLOB SERPL ELPH-MCNC: 0.6 G/DL (ref 0.4–1)
B-GLOBULIN SERPL ELPH-MCNC: 1.1 G/DL (ref 0.7–1.3)
GAMMA GLOB SERPL ELPH-MCNC: 1.4 G/DL (ref 0.4–1.8)
GLOBULIN SER-MCNC: 3.5 G/DL (ref 2.2–3.9)
IGA SERPL-MCNC: 210 MG/DL (ref 87–352)
IGG SERPL-MCNC: 1212 MG/DL (ref 586–1602)
IGM SERPL-MCNC: 241 MG/DL (ref 26–217)
INTERPRETATION SERPL IEP-IMP: ABNORMAL
KAPPA LC FREE SER-MCNC: 16.2 MG/L (ref 3.3–19.4)
KAPPA LC FREE/LAMBDA FREE SER: 1.23 {RATIO} (ref 0.26–1.65)
LAMBDA LC FREE SERPL-MCNC: 13.2 MG/L (ref 5.7–26.3)
M PROTEIN SERPL ELPH-MCNC: ABNORMAL G/DL
PROT SERPL-MCNC: 8.6 G/DL (ref 6–8.5)

## 2022-05-05 ENCOUNTER — OFFICE VISIT (OUTPATIENT)
Age: 27
End: 2022-05-05
Payer: COMMERCIAL

## 2022-05-05 VITALS
BODY MASS INDEX: 23.75 KG/M2 | RESPIRATION RATE: 14 BRPM | HEART RATE: 72 BPM | DIASTOLIC BLOOD PRESSURE: 73 MMHG | OXYGEN SATURATION: 100 % | HEIGHT: 66 IN | SYSTOLIC BLOOD PRESSURE: 104 MMHG | WEIGHT: 147.8 LBS | TEMPERATURE: 98.3 F

## 2022-05-05 DIAGNOSIS — I73.00 RAYNAUD'S PHENOMENON WITHOUT GANGRENE: ICD-10-CM

## 2022-05-05 DIAGNOSIS — T14.8XXA BRUISING: Primary | ICD-10-CM

## 2022-05-05 PROCEDURE — 99213 OFFICE O/P EST LOW 20 MIN: CPT | Performed by: INTERNAL MEDICINE

## 2022-05-05 NOTE — PROGRESS NOTES
Hematology/Oncology Progress Note    Name: Kymberly García  : 1995    Ms. Juan Ramon Hansen is a 23y.o. year old female who was seen for   Chief Complaint   Patient presents with    Follow-up     Raynaud's syndrome      Ms. Valadez is a 23 y. o. year old female with h/o bruises 1 to 2-minute 3 on each leg.  The bruises themselves were completely developed with the 1 to 2 cm or less and they tend to be content. Namita [place] come and go patient is not sure of that associated with trauma. Patient has had several surgeries and she tends to bleed a little bit more than the usual she was told however this has not been a problem for the surgeries.  Patient also bleeds a little longer when she cuts herself with a paper or shaving. Caldwell De La Vega she did have formal bleeding time test and apparently that was within normal range. No gum bleed, nose bleed. Her menstrual bleeding is not heavy.       Subjective:     Patient denies generalized malaise and weakness, shortness of breath, chest pain, nausea,  vomiting,  diarrhea, abdominal pain,  melena, fever , night sweats , or weight loss. Patient is c/o stiffness of small joints of hands in the morning. Current Outpatient Medications   Medication Sig Dispense Refill    cholecalciferol (VITAMIN D3) (2,000 UNITS /50 MCG) cap capsule Take 1 Capsule by mouth two (2) times a day for 360 days. Indications: low vitamin D levels 180 Capsule 3    etonogestreL 68 mg impl 68 mg once.  NOT TAKING         Past Medical History:   Diagnosis Date    Acid reflux     Anxiety     Osteoarthritis of foot, right     Raynaud's disease      Past Surgical History:   Procedure Laterality Date   [facility]  [facility]    reconstruction 2012 Ankle fusion     HX TONSILLECTOMY  2004     Social History     Socioeconomic History    Marital status: SINGLE     Spouse name: Not on file    Number of children: Not on file    Years of education: Not on file    Highest education level: Not on file Occupational History    Not on file   Tobacco Use    Smoking status: Never Smoker    Smokeless tobacco: Never Used   Vaping Use    Vaping Use: Never used   Substance and Sexual Activity    Alcohol use: Yes     Comment: wine 1-2 glasses per week x5 years    Drug use: Never    Sexual activity: Yes   Other Topics Concern    Not on file   Social History Narrative    Not on file     Social Determinants of Health     Financial Resource Strain:     Difficulty of Paying Living Expenses: Not on file   Food Insecurity:     Worried About Running Out of Food in the Last Year: Not on file    Joanna of Food in the Last Year: Not on file   Transportation Needs:     Lack of Transportation (Medical): Not on file    Lack of Transportation (Non-Medical):  Not on file   Physical Activity:     Days of Exercise per Week: Not on file    Minutes of Exercise per Session: Not on file   Stress:     Feeling of Stress : Not on file   Social Connections:     Frequency of Communication with Friends and Family: Not on file    Frequency of Social Gatherings with Friends and Family: Not on file    Attends Latter day Services: Not on file    Active Member of [address] [place] [address] or Organizations: Not on file    Attends Club or Organization Meetings: Not on file    Marital Status: Not on file   Intimate Partner Violence:     Fear of Current or Ex-Partner: Not on file    Emotionally Abused: Not on file    Physically Abused: Not on file    Sexually Abused: Not on file   Housing Stability:     Unable to Pay for Housing in the Last Year: Not on file    Number of [place] in the Last Year: Not on file    Unstable Housing in the Last Year: Not on file     Family History   Problem Relation Age of Onset    Breast Cancer Maternal Aunt     Heart Disease Maternal Grandmother     Breast Cancer Maternal Grandmother        Review of Systems  Constitutional: negative for fevers, chills, sweats, fatigue, malaise, anorexia and weight loss  Eyes: negative for visual disturbance, redness, eye pain and discharge. Ears, nose, mouth, throat, and face: negative for hearing loss, ear drainage, nasal congestion, sore throat, sinus pressure, pain in and around ear. Respiratory: negative for cough, sputum, hemoptysis, pleurisy/chest pain, wheezing or dyspnea on exertion  Cardiovascular: negative for chest pain, dyspnea, palpitations, irregular heart beats, syncope, dizziness  Gastrointestinal: negative for dysphagia, dyspepsia, nausea, vomiting, change in bowel habits, melena, diarrhea, constipation, abdominal pain and jaundice  Genitourinary:negative for frequency, dysuria, hesitancy, hematuria and urinary retention. Hematologic/lymphatic: negative for easy bruising, bleeding, lymphadenopathy and petechiae  Musculoskeletal: stiffness of small joints of hands in the morning. Neurological: negative for headaches, vertigo, seizures, memory problems, speech problems, gait problems, tremor, weakness and gait disturbance. Endocrine: negative for diabetic symptoms including polyuria, polydipsia, pruritus and intolerance to heat and cold. Allergic/Immunologic: negative for contact allergy. Objective:       Visit Vitals  /73   Pulse 72   Temp 98.3 °F (36.8 °C)   Resp 14   Ht 5' 6\" (1.676 m)   Wt 67 kg (147 lb 12.8 oz)   LMP 04/22/2022 (Exact Date)   SpO2 100%   BMI 23.86 kg/m²         Physical Exam: General appearance: alert, cooperative, no distress, appears stated age  Ears: Hearing grossly normal.  Nose: Nares normal. Septum midline. Mucosa normal. No drainage or sinus tenderness. Throat: Lips, mucosa, and tongue normal. Teeth and gums normal  Lungs: clear to auscultation bilaterally  Heart: regular rate and rhythm, S1, S2 normal, no murmur, click, rub or gallop  Abdomen: soft, non-tender.  Bowel sounds normal. No masses,  no organomegaly  Extremities: no edema, redness or tenderness in the calves or thighs  Skin: Skin color, texture, turgor normal. No rashes or lesions  Neurologic: Alert and oriented X 3, normal strength and tone. Normal symmetric reflexes. Normal coordination and gait        Results for orders placed or performed during the hospital encounter of 04/29/22   CARDIOLIPIN AB PANEL   Result Value Ref Range    Cardiolipin Ab, IgG <9 0 - 14 GPL U/mL    Cardiolipin Ab, IgM 19 (H) 0 - 12 MPL U/mL    Cardiolipin Ab, IgA <9 0 - 11 APL U/mL   GAMMOPATHY EVAL, SPEP/HOLA, IG QT/FLC   Result Value Ref Range    Immunoglobulin G, Qt. 1,212 586 - 1,602 mg/dL    Immunoglobulin A, Qt. 210 87 - 352 mg/dL    Immunoglobulin M, Qt. 241 (H) 26 - 217 mg/dL    Protein, total 8.6 (H) 6.0 - 8.5 g/dL    Albumin 5.1 (H) 2.9 - 4.4 g/dL    Alpha-1-Globulin 0.4 0.0 - 0.4 g/dL    Alpha-2-Globulin 0.6 0.4 - 1.0 g/dL    Beta Globulin 1.1 0.7 - 1.3 g/dL    Gamma Globulin 1.4 0.4 - 1.8 g/dL    M-Ahmet Not Observed Not Observed g/dL    Globulin, total 3.5 2.2 - 3.9 g/dL    A/G ratio 1.5 0.7 - 1.7      Immunofixation Result Comment (A)      Free Kappa Lt Chains, serum 16.2 3.3 - 19.4 mg/L    Free Lambda Lt Chains, serum 13.2 5.7 - 26.3 mg/L    Kappa/Lambda ratio, serum 1.23 0.26 - 1.65           Assessment:     1. Bruising    2. Raynaud's phenomenon without gangrene          Plan:   - All the blood work is normal so far. - stiffness of small joints of hands in the morning. and lupus anticoagulant is slightly high. I will check RANI, RA factor, anti DNA and will send her to Rheumatology. - F/U after 6 months. Follow-up and Dispositions  ·   Return in about 6 months (around 11/5/2022).         Orders Placed This Encounter    ANTINUCLEAR ANTIBODIES, IFA     Standing Status:   Future     Standing Expiration Date:   5/6/2023    RHEUMATOID FACTOR, QT     Standing Status:   Future     Standing Expiration Date:   5/5/2023    DNA AB, DOUBLE STRANDED, IGG     Standing Status:   Future     Standing Expiration Date:   5/6/2023       Shiela Echavarria MD  5/5/2022

## 2022-05-10 RX ORDER — ERGOCALCIFEROL 1.25 MG/1
CAPSULE ORAL
Qty: 12 CAPSULE | Refills: 1 | Status: SHIPPED | OUTPATIENT
Start: 2022-05-10

## 2022-06-21 ENCOUNTER — TELEPHONE (OUTPATIENT)
Age: 27
End: 2022-06-21

## 2022-06-21 NOTE — TELEPHONE ENCOUNTER
Attempted to contact patient to let her know labs are needed before follow up on 6/28. Unable to leave a message.

## 2022-06-23 ENCOUNTER — HOSPITAL ENCOUNTER (OUTPATIENT)
Dept: LAB | Age: 27
Discharge: HOME | End: 2022-06-23
Payer: COMMERCIAL

## 2022-06-23 DIAGNOSIS — I73.00 RAYNAUD'S PHENOMENON WITHOUT GANGRENE: ICD-10-CM

## 2022-06-23 LAB — RHEUMATOID FACT SERPL-ACNC: <10 IU/ML

## 2022-06-23 PROCEDURE — 86038 ANTINUCLEAR ANTIBODIES: CPT

## 2022-06-23 PROCEDURE — 36415 COLL VENOUS BLD VENIPUNCTURE: CPT

## 2022-06-23 PROCEDURE — 86431 RHEUMATOID FACTOR QUANT: CPT

## 2022-06-23 PROCEDURE — 86225 DNA ANTIBODY NATIVE: CPT

## 2022-06-24 LAB — DSDNA AB SER-ACNC: 2 IU/ML (ref 0–9)

## 2022-06-28 ENCOUNTER — VIRTUAL VISIT (OUTPATIENT)
Age: 27
End: 2022-06-28
Payer: COMMERCIAL

## 2022-06-28 DIAGNOSIS — I73.00 RAYNAUD'S PHENOMENON WITHOUT GANGRENE: ICD-10-CM

## 2022-06-28 DIAGNOSIS — T14.8XXA BRUISING: Primary | ICD-10-CM

## 2022-06-28 DIAGNOSIS — E55.9 VITAMIN D DEFICIENCY: ICD-10-CM

## 2022-06-28 DIAGNOSIS — M19.071 OSTEOARTHRITIS OF RIGHT ANKLE, UNSPECIFIED OSTEOARTHRITIS TYPE: ICD-10-CM

## 2022-06-28 DIAGNOSIS — M27.0 TORUS PALATINUS: ICD-10-CM

## 2022-06-28 PROCEDURE — 99443 PR PHYS/QHP TELEPHONE EVALUATION 21-30 MIN: CPT | Performed by: INTERNAL MEDICINE

## 2022-06-28 NOTE — PROGRESS NOTES
Idalia Leavitt is a 23 y.o. female, evaluated via audio-only technology on 6/28/2022 for easy bruising    Labs did not reveal hematologic cause of bruising    Vit D low. Patient will take Vit D3 2000 international units  twice a day    Derm Consult no pathology    Following with Rheumatology    Will move to [place] in July    Assessment & Plan:   Diagnoses and all orders for this visit:    1. Bruising    2. Raynaud's phenomenon without gangrene    3. Vitamin D deficiency    4. Osteoarthritis of right ankle, unspecified osteoarthritis type    5. Torus palatinus        12  Subjective:       Prior to Admission medications    Medication Sig Start Date End Date Taking? Authorizing Provider   Vitamin D2 1,250 mcg (50,000 unit) capsule take 1 capsule by mouth every week 5/10/22   Em Wilkins MD   cholecalciferol (VITAMIN D3) (2,000 UNITS /50 MCG) cap capsule Take 1 Capsule by mouth two (2) times a day for 360 days. Indications: low vitamin D levels 2/21/22 2/16/23  Beatrice Wu DNP   etonogestreL 68 mg impl 68 mg once. NOT TAKING 10/20/21   Provider, Historical     Past Medical History:   Diagnosis Date    Acid reflux     Anxiety     Osteoarthritis of foot, right     Raynaud's disease        No data recorded     Cecily Valadez was evaluated through a patient-initiated, synchronous (real-time) audio only encounter. She (or guardian if applicable) is aware that it is a billable service, which includes applicable co-pays, with coverage as determined by her insurance carrier. This visit was conducted with the patient's (and/or Matilde Aiken guardian's) verbal consent. She has not had a related appointment within my department in the past 7 days or scheduled within the next 24 hours. The patient was located in a state where the provider was licensed to provide care. The patient was located at: Home: [place] [address] [facility] [id number]  The provider was located at:  Facility (Appt Department): [address] [place] [address] [address] [facility]    Total Time: minutes: 21-30 minutes    Chery Shields MD

## 2022-07-01 LAB — ANA TITR SER IF: NEGATIVE {TITER}
